# Patient Record
Sex: MALE | Race: WHITE | HISPANIC OR LATINO | Employment: FULL TIME | URBAN - METROPOLITAN AREA
[De-identification: names, ages, dates, MRNs, and addresses within clinical notes are randomized per-mention and may not be internally consistent; named-entity substitution may affect disease eponyms.]

---

## 2020-02-22 ENCOUNTER — OCCUPATIONAL MEDICINE (OUTPATIENT)
Dept: URGENT CARE | Facility: PHYSICIAN GROUP | Age: 55
End: 2020-02-22
Payer: COMMERCIAL

## 2020-02-22 VITALS
HEART RATE: 87 BPM | DIASTOLIC BLOOD PRESSURE: 90 MMHG | WEIGHT: 240 LBS | RESPIRATION RATE: 14 BRPM | OXYGEN SATURATION: 94 % | BODY MASS INDEX: 31.81 KG/M2 | TEMPERATURE: 98.7 F | SYSTOLIC BLOOD PRESSURE: 136 MMHG | HEIGHT: 73 IN

## 2020-02-22 DIAGNOSIS — S69.92XA INJURY OF LEFT HAND, INITIAL ENCOUNTER: ICD-10-CM

## 2020-02-22 DIAGNOSIS — Z02.1 PRE-EMPLOYMENT DRUG SCREENING: Primary | ICD-10-CM

## 2020-02-22 DIAGNOSIS — S61.217A LACERATION OF LEFT LITTLE FINGER WITHOUT FOREIGN BODY WITHOUT DAMAGE TO NAIL, INITIAL ENCOUNTER: ICD-10-CM

## 2020-02-22 DIAGNOSIS — S61.215A LACERATION OF LEFT RING FINGER WITHOUT FOREIGN BODY WITHOUT DAMAGE TO NAIL, INITIAL ENCOUNTER: ICD-10-CM

## 2020-02-22 LAB
AMP AMPHETAMINE: NORMAL
BAR BARBITURATES: NORMAL
BREATH ALCOHOL COMMENT: NORMAL
BZO BENZODIAZEPINES: NORMAL
COC COCAINE: NORMAL
INT CON NEG: NEGATIVE
INT CON POS: POSITIVE
MDMA ECSTASY: NORMAL
MET METHAMPHETAMINES: NORMAL
MTD METHADONE: NORMAL
OPI OPIATES: NORMAL
OXY OXYCODONE: NORMAL
PCP PHENCYCLIDINE: NORMAL
POC BREATHALIZER: 0 PERCENT (ref 0–0.01)
POC URINE DRUG SCREEN OCDRS: NORMAL
THC: NORMAL

## 2020-02-22 PROCEDURE — 90471 IMMUNIZATION ADMIN: CPT | Performed by: INTERNAL MEDICINE

## 2020-02-22 PROCEDURE — 99204 OFFICE O/P NEW MOD 45 MIN: CPT | Mod: 25 | Performed by: INTERNAL MEDICINE

## 2020-02-22 PROCEDURE — 90715 TDAP VACCINE 7 YRS/> IM: CPT | Performed by: INTERNAL MEDICINE

## 2020-02-22 PROCEDURE — 80305 DRUG TEST PRSMV DIR OPT OBS: CPT | Performed by: INTERNAL MEDICINE

## 2020-02-22 PROCEDURE — 82075 ASSAY OF BREATH ETHANOL: CPT | Performed by: INTERNAL MEDICINE

## 2020-02-22 PROCEDURE — 12041 INTMD RPR N-HF/GENIT 2.5CM/<: CPT | Performed by: INTERNAL MEDICINE

## 2020-02-22 ASSESSMENT — PAIN SCALES - GENERAL: PAINLEVEL: 5=MODERATE PAIN

## 2020-02-22 NOTE — LETTER
[facility] [place]  [address] [place] [address]  Phone:  [phone] - Fax:  [phone]   [facility] Progress Report and Disability Certification  Date of Service: 2/22/2020   No Show:  No  Date / Time of Next Visit: 2/25/2020   Claim Information   Patient Name: Hector Hernandez  Claim Number:     Employer: [employer]  Date of Injury: 2/22/2020     Insurer / TPA: [employer]  ID / SSN:     Occupation:  Diagnosis: Diagnoses of Injury of left hand, initial encounter, Laceration of left little finger without foreign body without damage to nail, initial encounter, and Laceration of left ring finger without foreign body without damage to nail, initial encounter were pertinent to this visit.    Medical Information   Related to Industrial Injury? Yes    Subjective Complaints:  Hector Hernandez is a 54 y.o. male who presents for Laceration (Left pink Lac)     Patient was removing a jelly roll from the machine and his 2 fingers on his left hand caught the blade and and he has 1 very small laceration on the ring finger and a 2 cm laceration on the left fifth finger  Laceration    The incident occurred 3 to 6 hours ago. The laceration is located on the left hand. The laceration is 2 cm in size. The laceration mechanism was a metal edge. The pain is mild. He reports no foreign bodies present. His tetanus status is out of date.    Objective Findings: Physical Exam  Musculoskeletal:         General: Signs of injury present.      Comments: Left hand-fifth finger 2 cm laceration nongas gaping and not bleeding and range of motion is within normal limits and no tendon injury and no distal neurovascular abnormality    Ring finger 3 mm laceration non-gaping and nonbleeding and range of motion is within normal limits        Pre-Existing Condition(s):     Assessment:   Initial Visit    Status: Additional Care Required  Permanent  Disability:No    Plan:      Diagnostics:      Comments:       Disability Information   Status: Released to Restricted Duty    From:  2/22/2020  Through: 2/25/2020 Restrictions are: Temporary  Comments:No use of left hand for 3 days and will reevaluate   Physical Restrictions   Sitting:    Standing:    Stooping:    Bending:      Squatting:    Walking:    Climbing:    Pushing:      Pulling:    Other:    Reaching Above Shoulder (L):   Reaching Above Shoulder (R):       Reaching Below Shoulder (L):    Reaching Below Shoulder (R):      Not to exceed Weight Limits   Carrying(hrs):   Weight Limit(lb):   Lifting(hrs):   Weight  Limit(lb):     Comments:      Repetitive Actions   Hands: i.e. Fine Manipulations from Grasping:     Feet: i.e. Operating Foot Controls:     Driving / Operate Machinery:     Physician Name: Helder Ren M.D. Physician Signature: HELDER Ferraro M.D. e-Signature: Dr. Erick Sandhu, Medical Director   Clinic Name / Location: [address] [address] Clinic Phone Number: Dept: [phone]   Appointment Time: 3:30 Pm Visit Start Time: 4:46 PM   Check-In Time:  3:48 Pm Visit Discharge Time: 5:25 PM   Original-Treating Physician or Chiropractor    Page 2-Insurer/TPA    Page 3-Employer    Page 4-Employee

## 2020-02-23 NOTE — PROGRESS NOTES
Subjective:     Hector Hernandez is a 54 y.o. male who presents for Laceration (Left pink Lac)    Hector Hernandez is a 54 y.o. male who presents for Laceration (Left pink Lac)     Patient was removing a jelly roll from the machine and his 2 fingers on his left hand caught the blade and and he has 1 very small laceration on the ring finger and a 2 cm laceration on the left fifth finger  Laceration    The incident occurred 3 to 6 hours ago. The laceration is located on the left hand. The laceration is 2 cm in size. The laceration mechanism was a metal edge. The pain is mild. He reports no foreign bodies present. His tetanus status is out of date. Patient was removing a jelly roll from the machine and his 2 fingers on his left hand caught the blade and and he has 1 very small laceration on the ring finger and a 2 cm laceration on the left fifth finger  Laceration    The incident occurred 3 to 6 hours ago. The laceration is located on the left hand. The laceration is 2 cm in size. The laceration mechanism was a metal edge. The pain is mild. He reports no foreign bodies present. His tetanus status is out of date.   History reviewed. No pertinent past medical history.History reviewed. No pertinent surgical history.  Social History     Socioeconomic History   • Marital status:      Spouse name: Not on file   • Number of children: Not on file   • Years of education: Not on file   • Highest education level: Not on file   Occupational History   • Not on file   Social Needs   • Financial resource strain: Not on file   • Food insecurity     Worry: Not on file     Inability: Not on file   • Transportation needs     Medical: Not on file     Non-medical: Not on file   Tobacco Use   • Smoking status: Never Smoker   • Smokeless tobacco: Never Used   Substance and Sexual Activity   • Alcohol use: Not on file   • Drug use: Not on file   • Sexual activity: Not on file   Lifestyle   • Physical activity     Days per week: Not on file  "    Minutes per session: Not on file   • Stress: Not on file   Relationships   • Social connections     Talks on phone: Not on file     Gets together: Not on file     Attends Rastafarian service: Not on file     Active member of club or organization: Not on file     Attends meetings of clubs or organizations: Not on file     Relationship status: Not on file   • Intimate partner violence     Fear of current or ex partner: Not on file     Emotionally abused: Not on file     Physically abused: Not on file     Forced sexual activity: Not on file   Other Topics Concern   • Not on file   Social History Narrative   • Not on file    History reviewed. No pertinent family history. Review of Systems   All other systems reviewed and are negative.  No Known Allergies   Objective:   /90   Pulse 87   Temp 37.1 °C (98.7 °F)   Resp 14   Ht 1.854 m (6' 1\")   Wt 108.9 kg (240 lb)   SpO2 94%   BMI 31.66 kg/m²   Physical Exam  Constitutional:       General: He is not in acute distress.     Appearance: He is well-developed.   HENT:      Head: Normocephalic and atraumatic.      Mouth/Throat:      Mouth: Mucous membranes are moist.      Pharynx: Oropharynx is clear.   Eyes:      Conjunctiva/sclera: Conjunctivae normal.   Neck:      Musculoskeletal: No neck rigidity.   Cardiovascular:      Rate and Rhythm: Normal rate and regular rhythm.   Pulmonary:      Effort: Pulmonary effort is normal. No respiratory distress.      Breath sounds: Normal breath sounds.   Musculoskeletal:         General: Signs of injury present.      Comments: Left hand-fifth finger 2 cm laceration nongas gaping and not bleeding and range of motion is within normal limits and no tendon injury and no distal neurovascular abnormality    Ring finger 3 mm laceration non-gaping and nonbleeding and range of motion is within normal limits   Lymphadenopathy:      Cervical: No cervical adenopathy.   Skin:     General: Skin is warm and dry.      Capillary Refill: " Capillary refill takes less than 2 seconds.   Neurological:      Mental Status: He is alert and oriented to person, place, and time.      Sensory: No sensory deficit.      Deep Tendon Reflexes: Reflexes are normal and symmetric.   Psychiatric:         Mood and Affect: Mood normal.         Behavior: Behavior normal.       Physical Exam  Musculoskeletal:         General: Signs of injury present.      Comments: Left hand-fifth finger 2 cm laceration nongas gaping and not bleeding and range of motion is within normal limits and no tendon injury and no distal neurovascular abnormality    Ring finger 3 mm laceration non-gaping and nonbleeding and range of motion is within normal limits        Assessment/Plan:   Assessment    1. Injury of left hand, initial encounter  - Tdap =>6yo IM    2. Laceration of left little finger without foreign body without damage to nail, initial encounter  - Tdap =>6yo IM    3. Laceration of left ring finger without foreign body without damage to nail, initial encounter  - Tdap =>6yo IM    steristrips applied      Differential diagnosis, natural history, supportive care, and indications for immediate follow-up discussed.

## 2020-02-25 ENCOUNTER — OCCUPATIONAL MEDICINE (OUTPATIENT)
Dept: URGENT CARE | Facility: PHYSICIAN GROUP | Age: 55
End: 2020-02-25
Payer: COMMERCIAL

## 2020-02-25 VITALS
TEMPERATURE: 99.1 F | SYSTOLIC BLOOD PRESSURE: 146 MMHG | HEART RATE: 89 BPM | HEIGHT: 73 IN | RESPIRATION RATE: 13 BRPM | OXYGEN SATURATION: 97 % | DIASTOLIC BLOOD PRESSURE: 92 MMHG | BODY MASS INDEX: 31.81 KG/M2 | WEIGHT: 240 LBS

## 2020-02-25 DIAGNOSIS — S61.217D LACERATION OF LEFT LITTLE FINGER WITHOUT FOREIGN BODY WITHOUT DAMAGE TO NAIL, SUBSEQUENT ENCOUNTER: Primary | ICD-10-CM

## 2020-02-25 PROCEDURE — 99214 OFFICE O/P EST MOD 30 MIN: CPT | Performed by: PHYSICIAN ASSISTANT

## 2020-02-25 ASSESSMENT — PAIN SCALES - GENERAL: PAINLEVEL: 1=MINIMAL PAIN

## 2020-02-25 NOTE — LETTER
[facility] [place]  [address] [place] [address]  Phone:  [phone] - Fax:  [phone]   [facility] Progress Report and Disability Certification  Date of Service: 2/25/2020   No Show:  No  Date / Time of Next Visit:   D/C MMI   Claim Information   Patient Name: Hector Hernandez  Claim Number:     Employer: [employer] [facility] [employer] Date of Injury: 2/22/2020     Insurer / TPA: [employer]  ID / SSN:     Occupation:   Diagnosis: The encounter diagnosis was Laceration of left little finger without foreign body without damage to nail, subsequent encounter.    Medical Information   Related to Industrial Injury? Yes    Subjective Complaints:  DOI: 2/22/20    Pt returns for follow up with well healed laceration. Patient restates he was removing a jelly roll from the machine and his 2 fingers on his left hand caught the blade and and he has 1 very small laceration on the ring finger and a 2 cm laceration on the left fifth finger  Laceration    The incident occurred 3 days ago. The laceration is located on the left hand. The laceration is 2 cm in size. The laceration mechanism was a metal edge. The pain is mild. He reports no foreign bodies present. His tetanus status is now UTD from last visit. Pt has not taken any Rx medications for this condition. Pt states the pain is a 0-1/10, aching in nature and worse at night. Pt denies CP, SOB, NVD, paresthesias, headaches, dizziness, change in vision, hives, or other joint pain. The pt's medication list, problem list, and allergies have been evaluated and reviewed during today's visit.     Objective Findings: Left little finger: No ecchymoses, no edema, no erythema, no signs of infection present, No TTP. +AROM, +SILT, STR 5/5.     Pre-Existing Condition(s):     Assessment:   Condition Improved    Status: Discharged /  MMI  Permanent Disability:No    Plan:      Diagnostics:         Comments:       Disability Information   Status: Released to Full Duty    From:     Through:   Restrictions are:     Physical Restrictions   Sitting:    Standing:    Stooping:    Bending:      Squatting:    Walking:    Climbing:    Pushing:      Pulling:    Other:    Reaching Above Shoulder (L):   Reaching Above Shoulder (R):       Reaching Below Shoulder (L):    Reaching Below Shoulder (R):      Not to exceed Weight Limits   Carrying(hrs):   Weight Limit(lb):   Lifting(hrs):   Weight  Limit(lb):     Comments: D/C MMI    Repetitive Actions   Hands: i.e. Fine Manipulations from Grasping:     Feet: i.e. Operating Foot Controls:     Driving / Operate Machinery:     Physician Name: Gavin Silva P.A.-C. Physician Signature: GAVIN Benitez P.A.-C. e-Signature: Dr. Erick Sandhu, Medical Director   Clinic Name / Location: [address] [address] Clinic Phone Number: Dept: [phone]   Appointment Time: 10:00 Am Visit Start Time: 10:41 AM   Check-In Time:  10:25 Am Visit Discharge Time: 10:50 AM   Original-Treating Physician or Chiropractor    Page 2-Insurer/TPA    Page 3-Employer    Page 4-Employee

## 2020-02-25 NOTE — PATIENT INSTRUCTIONS
Laceration Care, Adult  A laceration is a cut that goes through all layers of the skin. The cut also goes into the tissue that is right under the skin. Some cuts heal on their own. Others need to be closed with stitches (sutures), staples, skin adhesive strips, or wound glue. Taking care of your cut lowers your risk of infection and helps your cut to heal better.  HOW TO TAKE CARE OF YOUR CUT  For stitches or staples:  · Keep the wound clean and dry.  · If you were given a bandage (dressing), you should change it at least one time per day or as told by your doctor. You should also change it if it gets wet or dirty.  · Keep the wound completely dry for the first 24 hours or as told by your doctor. After that time, you may take a shower or a bath. However, make sure that the wound is not soaked in water until after the stitches or staples have been removed.  · Clean the wound one time each day or as told by your doctor:  ¨ Wash the wound with soap and water.  ¨ Rinse the wound with water until all of the soap comes off.  ¨ Pat the wound dry with a clean towel. Do not rub the wound.  · After you clean the wound, put a thin layer of antibiotic ointment on it as told by your doctor. This ointment:  ¨ Helps to prevent infection.  ¨ Keeps the bandage from sticking to the wound.  · Have your stitches or staples removed as told by your doctor.  If your doctor used skin adhesive strips:   · Keep the wound clean and dry.  · If you were given a bandage, you should change it at least one time per day or as told by your doctor. You should also change it if it gets dirty or wet.  · Do not get the skin adhesive strips wet. You can take a shower or a bath, but be careful to keep the wound dry.  · If the wound gets wet, pat it dry with a clean towel. Do not rub the wound.  · Skin adhesive strips fall off on their own. You can trim the strips as the wound heals. Do not remove any strips that are still stuck to the wound. They will  fall off after a while.  If your doctor used wound glue:  · Try to keep your wound dry, but you may briefly wet it in the shower or bath. Do not soak the wound in water, such as by swimming.  · After you take a shower or a bath, gently pat the wound dry with a clean towel. Do not rub the wound.  · Do not do any activities that will make you really sweaty until the skin glue has fallen off on its own.  · Do not apply liquid, cream, or ointment medicine to your wound while the skin glue is still on.  · If you were given a bandage, you should change it at least one time per day or as told by your doctor. You should also change it if it gets dirty or wet.  · If a bandage is placed over the wound, do not let the tape for the bandage touch the skin glue.  · Do not pick at the glue. The skin glue usually stays on for 5-10 days. Then, it falls off of the skin.  General Instructions   · To help prevent scarring, make sure to cover your wound with sunscreen whenever you are outside after stitches are removed, after adhesive strips are removed, or when wound glue stays in place and the wound is healed. Make sure to wear a sunscreen of at least 30 SPF.  · Take over-the-counter and prescription medicines only as told by your doctor.  · If you were given antibiotic medicine or ointment, take or apply it as told by your doctor. Do not stop using the antibiotic even if your wound is getting better.  · Do not scratch or pick at the wound.  · Keep all follow-up visits as told by your doctor. This is important.  · Check your wound every day for signs of infection. Watch for:  ¨ Redness, swelling, or pain.  ¨ Fluid, blood, or pus.  · Raise (elevate) the injured area above the level of your heart while you are sitting or lying down, if possible.  GET HELP IF:  · You got a tetanus shot and you have any of these problems at the injection site:  ¨ Swelling.  ¨ Very bad pain.  ¨ Redness.  ¨ Bleeding.  · You have a fever.  · A wound that was  closed breaks open.  · You notice a bad smell coming from your wound or your bandage.  · You notice something coming out of the wound, such as wood or glass.  · Medicine does not help your pain.  · You have more redness, swelling, or pain at the site of your wound.  · You have fluid, blood, or pus coming from your wound.  · You notice a change in the color of your skin near your wound.  · You need to change the bandage often because fluid, blood, or pus is coming from the wound.  · You start to have a new rash.  · You start to have numbness around the wound.  GET HELP RIGHT AWAY IF:  · You have very bad swelling around the wound.  · Your pain suddenly gets worse and is very bad.  · You notice painful lumps near the wound or on skin that is anywhere on your body.  · You have a red streak going away from your wound.  · The wound is on your hand or foot and you cannot move a finger or toe like you usually can.  · The wound is on your hand or foot and you notice that your fingers or toes look pale or bluish.     This information is not intended to replace advice given to you by your health care provider. Make sure you discuss any questions you have with your health care provider.     Document Released: 06/05/2009 Document Revised: 05/03/2016 Document Reviewed: 12/14/2015  [employer] Interactive Patient Education ©2016 [employer]

## 2020-02-25 NOTE — PROGRESS NOTES
Subjective:      Pt is a 54 y.o. male who presents with Follow-Up ()      DOI: 2/22/20    Pt returns for follow up with well healed laceration. Patient restates he was removing a jelly roll from the machine and his 2 fingers on his left hand caught the blade and and he has 1 very small laceration on the ring finger and a 2 cm laceration on the left fifth finger  Laceration    The incident occurred 3 days ago. The laceration is located on the left hand. The laceration is 2 cm in size. The laceration mechanism was a metal edge. The pain is mild. He reports no foreign bodies present. His tetanus status is now UTD from last visit. Pt has not taken any Rx medications for this condition. Pt states the pain is a 0-1/10, aching in nature and worse at night. Pt denies CP, SOB, NVD, paresthesias, headaches, dizziness, change in vision, hives, or other joint pain. The pt's medication list, problem list, and allergies have been evaluated and reviewed during today's visit.       HPI  PMH:  Negative per pt.      PSH:  Negative per pt.      Fam Hx:  the patient's family history is not pertinent to their current complaint      Soc HX:  Social History     Socioeconomic History   • Marital status:      Spouse name: Not on file   • Number of children: Not on file   • Years of education: Not on file   • Highest education level: Not on file   Occupational History   • Not on file   Social Needs   • Financial resource strain: Not on file   • Food insecurity     Worry: Not on file     Inability: Not on file   • Transportation needs     Medical: Not on file     Non-medical: Not on file   Tobacco Use   • Smoking status: Never Smoker   • Smokeless tobacco: Never Used   Substance and Sexual Activity   • Alcohol use: Not on file   • Drug use: Not on file   • Sexual activity: Not on file   Lifestyle   • Physical activity     Days per week: Not on file     Minutes per session: Not on file   • Stress: Not on file   Relationships   •  "Social connections     Talks on phone: Not on file     Gets together: Not on file     Attends Mosque service: Not on file     Active member of club or organization: Not on file     Attends meetings of clubs or organizations: Not on file     Relationship status: Not on file   • Intimate partner violence     Fear of current or ex partner: Not on file     Emotionally abused: Not on file     Physically abused: Not on file     Forced sexual activity: Not on file   Other Topics Concern   • Not on file   Social History Narrative   • Not on file         Medications:  No current outpatient medications on file.      Allergies:  Patient has no known allergies.    ROS  Constitutional: Negative for fever, chills and malaise/fatigue.   HENT: Negative for congestion and sore throat.    Eyes: Negative for blurred vision, double vision and photophobia.   Respiratory: Negative for cough and shortness of breath.  Cardiovascular: Negative for chest pain and palpitations.   Gastrointestinal: Negative for heartburn, nausea, vomiting, abdominal pain, diarrhea and constipation.   Genitourinary: Negative for dysuria and flank pain.   Musculoskeletal: POS for left little finger lac and myalgias.   Skin: Negative for itching and rash.   Neurological: Negative for dizziness, tingling and headaches.   Endo/Heme/Allergies: Does not bruise/bleed easily.   Psychiatric/Behavioral: Negative for depression. The patient is not nervous/anxious.           Objective:     /92   Pulse 89   Temp 37.3 °C (99.1 °F)   Resp 13   Ht 1.854 m (6' 1\")   Wt 108.9 kg (240 lb)   SpO2 97%   BMI 31.66 kg/m²      Physical Exam    Left little finger: No ecchymoses, no edema, no erythema, no signs of infection present, No TTP. +AROM, +SILT, STR 5/5.    Constitutional: PT is oriented to person, place, and time. PT appears well-developed and well-nourished. No distress.   HENT:   Head: Normocephalic and atraumatic.   Mouth/Throat: Oropharynx is clear and " moist. No oropharyngeal exudate.   Eyes: Conjunctivae normal and EOM are normal. Pupils are equal, round, and reactive to light.   Neck: Normal range of motion. Neck supple. No thyromegaly present.   Cardiovascular: Normal rate, regular rhythm, normal heart sounds and intact distal pulses.  Exam reveals no gallop and no friction rub.    No murmur heard.  Pulmonary/Chest: Effort normal and breath sounds normal. No respiratory distress. PT has no wheezes. PT has no rales. Pt exhibits no tenderness.   Abdominal: Soft. Bowel sounds are normal. PT exhibits no distension and no mass. There is no tenderness. There is no rebound and no guarding.   Neurological: PT is alert and oriented to person, place, and time. PT has normal reflexes. No cranial nerve deficit.   Skin: Skin is warm and dry. No rash noted. PT is not diaphoretic. No erythema.       Psychiatric: PT has a normal mood and affect. PT behavior is normal. Judgment and thought content normal.        Assessment/Plan:       1. Laceration of left little finger without foreign body without damage to nail, subsequent encounter      RICE therapy discussed  Gentle ROM exercises discussed  WBAT left hand  Ice/heat therapy discussed  OTC ibuprofen for pain control  Rest, fluids encouraged.  AVS with medical info given.  Pt was in full understanding and agreement with the plan.  Follow-up as needed if symptoms worsen or fail to improve.  D/C MMI

## 2020-06-12 ENCOUNTER — OFFICE VISIT (OUTPATIENT)
Dept: URGENT CARE | Facility: CLINIC | Age: 55
End: 2020-06-12
Payer: COMMERCIAL

## 2020-06-12 VITALS
BODY MASS INDEX: 34.46 KG/M2 | DIASTOLIC BLOOD PRESSURE: 94 MMHG | WEIGHT: 260 LBS | RESPIRATION RATE: 16 BRPM | TEMPERATURE: 98.1 F | HEIGHT: 73 IN | OXYGEN SATURATION: 94 % | SYSTOLIC BLOOD PRESSURE: 142 MMHG | HEART RATE: 81 BPM

## 2020-06-12 DIAGNOSIS — R21 SKIN RASH: ICD-10-CM

## 2020-06-12 PROCEDURE — 99214 OFFICE O/P EST MOD 30 MIN: CPT | Performed by: PHYSICIAN ASSISTANT

## 2020-06-12 RX ORDER — METHYLPREDNISOLONE 4 MG/1
TABLET ORAL
Qty: 21 TAB | Refills: 0 | Status: SHIPPED | OUTPATIENT
Start: 2020-06-12 | End: 2022-07-09

## 2020-06-12 RX ORDER — AMLODIPINE BESYLATE AND BENAZEPRIL HYDROCHLORIDE 10; 20 MG/1; MG/1
1 CAPSULE ORAL
COMMUNITY
Start: 2020-05-18 | End: 2022-07-09

## 2020-06-12 RX ORDER — GEMFIBROZIL 600 MG/1
600 TABLET, FILM COATED ORAL
COMMUNITY
Start: 2020-05-18 | End: 2022-07-09

## 2020-06-12 ASSESSMENT — ENCOUNTER SYMPTOMS
VOMITING: 0
FEVER: 0
SORE THROAT: 0
COUGH: 0
DIARRHEA: 0
NAUSEA: 0
SHORTNESS OF BREATH: 0
EYE DISCHARGE: 0
HEADACHES: 0
EYE REDNESS: 0

## 2020-06-12 NOTE — PROGRESS NOTES
"Subjective:      Hector Hernandez is a 54 y.o. male who presents with Rash        Rash   This is a new problem. Episode onset: x 3 days ago. The problem is unchanged. The rash is diffuse. The rash is characterized by itchiness. He was exposed to nothing. Pertinent negatives include no congestion, cough, diarrhea, facial edema, fever, joint pain, shortness of breath, sore throat or vomiting. Treatments tried: The patient states he has been using Safe Guard Soap and Aveno Body Wash for his current symptoms. The treatment provided mild relief.     PMH:  has no past medical history on file.  MEDS:   Current Outpatient Medications:   •  amlodipine-benazepril (LOTREL) 10-20 MG per capsule, Take 1 Cap by mouth., Disp: , Rfl:   •  gemfibrozil (LOPID) 600 MG Tab, TAKE 1 TABLET BY MOUTH TWICE DAILY FOR 90 DAYS, Disp: , Rfl:   ALLERGIES: No Known Allergies  SURGHX: History reviewed. No pertinent surgical history.  SOCHX:  reports that he has never smoked. He has never used smokeless tobacco.  FH: Family history was reviewed, no pertinent findings to report    Review of Systems   Constitutional: Negative for fever.   HENT: Negative for congestion, ear pain and sore throat.    Eyes: Negative for discharge and redness.   Respiratory: Negative for cough and shortness of breath.    Cardiovascular: Negative for chest pain and leg swelling.   Gastrointestinal: Negative for diarrhea, nausea and vomiting.   Musculoskeletal: Negative for joint pain.   Skin: Positive for rash.   Neurological: Negative for headaches.          Objective:     /94 (BP Location: Right arm, Patient Position: Sitting)   Pulse 81   Temp 36.7 °C (98.1 °F)   Resp 16   Ht 1.854 m (6' 1\")   Wt 117.9 kg (260 lb)   SpO2 94%   BMI 34.30 kg/m²      Physical Exam  Constitutional:       General: He is not in acute distress.     Appearance: Normal appearance. He is not ill-appearing.   HENT:      Head: Normocephalic and atraumatic.      Comments: No signs of " angioedema.     Right Ear: External ear normal.      Left Ear: External ear normal.      Nose: Nose normal.      Mouth/Throat:      Mouth: Mucous membranes are moist. No angioedema.      Pharynx: Oropharynx is clear. Uvula midline. No posterior oropharyngeal erythema or uvula swelling.      Tonsils: No tonsillar exudate.      Comments: No swelling of the tongue or throat.  Eyes:      Extraocular Movements: Extraocular movements intact.      Conjunctiva/sclera: Conjunctivae normal.   Neck:      Musculoskeletal: Normal range of motion and neck supple.   Cardiovascular:      Rate and Rhythm: Normal rate and regular rhythm.      Heart sounds: Normal heart sounds.   Pulmonary:      Effort: Pulmonary effort is normal. No respiratory distress.      Breath sounds: Normal breath sounds. No wheezing.   Musculoskeletal: Normal range of motion.   Skin:     General: Skin is warm and dry.      Findings: Rash present. Rash is papular. Rash is not urticarial or vesicular.             Comments:   Diffuse erythematous rough papules to the patient's chest, abdomen, back, and bilateral upper and lower extremities.  No tenderness to palpation.  No swelling.  No surrounding erythema.  No increased warmth.  No discharge/weeping.  No vesicles.  No pustules.   Neurological:      Mental Status: He is alert and oriented to person, place, and time.                 Assessment/Plan:     1. Skin rash  - methylPREDNISolone (MEDROL DOSEPAK) 4 MG Tablet Therapy Pack; Follow schedule on package instructions.  Dispense: 21 Tab; Refill: 0    The patient's presenting symptoms and physical exam findings are consistent with a diffuse skin rash.  On physical exam, the patient had diffuse erythematous rough papules to the chest, abdomen, back, and bilateral upper and lower extremities without tenderness to palpation, swelling, surrounding erythema, increased warmth, discharge/weeping, vesicles, or pustules.  The patient reports no new exposures to soaps,  lotions, detergents, medications, or foods.  The cause of the patient's skin rash is unknown at this time.  Will prescribe the patient a Medrol Dosepak for his current symptoms.  Advised the patient of the associated side effects of oral steroids, including immunosuppression.  The patient verbalized understanding.  Based on the patient's presenting symptoms and physical exam findings, it is unlikely the patient skin rash is secondarily infected at this time.  Recommend OTC medications and supportive care for symptomatic management.  Recommend patient follow-up with his PCP.  Discussed return precautions with the patient, and he verbalized understanding.    Differential diagnoses, supportive care, and indications for immediate follow-up discussed with patient.   Instructed to return to clinic or nearest emergency department for any change in condition, further concerns, or worsening of symptoms.    OTC antihistamines for symptomatic relief  Apply moisturizer to the affected areas  Monitor for signs of infection  Follow-up with PCP  Return to clinic or go to the ED if symptoms worsen or fail to improve, or if the patient should develop worsening/increasing/persistent skin rash, pain/tenderness to the affected area, swelling, increased redness or warmth, discharge/weeping, nausea/vomiting, fever/chills, secondary signs of infection, and/or any concerning symptoms.    Discussed plan with the patient, and he agrees to the above.    Please note that this dictation was created using voice recognition software. I have made every reasonable attempt to correct obvious errors, but I expect that there may be errors of grammar and possibly content that I did not discover before finalizing the note.

## 2021-01-15 ENCOUNTER — OCCUPATIONAL MEDICINE (OUTPATIENT)
Dept: URGENT CARE | Facility: CLINIC | Age: 56
End: 2021-01-15
Payer: COMMERCIAL

## 2021-01-15 VITALS
RESPIRATION RATE: 18 BRPM | SYSTOLIC BLOOD PRESSURE: 120 MMHG | DIASTOLIC BLOOD PRESSURE: 82 MMHG | OXYGEN SATURATION: 95 % | HEIGHT: 73 IN | TEMPERATURE: 97.9 F | BODY MASS INDEX: 34.46 KG/M2 | HEART RATE: 84 BPM | WEIGHT: 260 LBS

## 2021-01-15 DIAGNOSIS — L24.89 IRRITANT CONTACT DERMATITIS DUE TO OTHER AGENTS: ICD-10-CM

## 2021-01-15 PROCEDURE — 99214 OFFICE O/P EST MOD 30 MIN: CPT | Performed by: PHYSICIAN ASSISTANT

## 2021-01-15 RX ORDER — TRIAMCINOLONE ACETONIDE 1 MG/G
1 OINTMENT TOPICAL 2 TIMES DAILY
Qty: 30 G | Refills: 1 | Status: SHIPPED | OUTPATIENT
Start: 2021-01-15 | End: 2022-07-09

## 2021-01-15 ASSESSMENT — ENCOUNTER SYMPTOMS
DIARRHEA: 0
MUSCULOSKELETAL NEGATIVE: 1
DIZZINESS: 0
ABDOMINAL PAIN: 0
CHILLS: 0
VOMITING: 0
NAUSEA: 0
SHORTNESS OF BREATH: 0
FEVER: 0

## 2021-01-15 NOTE — LETTER
"EMPLOYEE’S CLAIM FOR COMPENSATION/ REPORT OF INITIAL TREATMENT  FORM C-4    EMPLOYEE’S CLAIM - PROVIDE ALL INFORMATION REQUESTED   First Name  Hector Last Name  Mary Birthdate                    1965                Sex  male Claim Number   Home Address  [address] Age  55 y.o. Height  1.854 m (6' 1\") Weight  117.9 kg (260 lb) [facility]     [place] Zip  [address] Telephone  [phone] (home)    Mailing Address  [address] [place] [facility] Zip  [address] Primary Language Spoken  English    Insurer   Third Party   [employer]   Employee's Occupation (Job Title) When Injury or Occupational Disease Occurred      Employer's Name  [employer]  Telephone  [phone]    Employer Address  [address]  [facility] [facility]  Zip  [address]    Date of Injury  12/15/2020               Hour of Injury  7:00 AM Date Employer Notified  1/13/2021 Last Day of Work after Injury     or Occupational Disease  1/15/2021 Supervisor to Whom Injury     Reported  Enrique   Address or Location of Accident (if applicable)  [[employer]]   What were you doing at the time of accident? (if applicable)  Have skin irritation with gloves    How did this injury or occupational disease occur? (Be specific an answer in detail. Use additional sheet if necessary)  Since December I had skin irritation on my hands by wearing gloves.   If you believe that you have an occupational disease, when did you first have knowledge of the disability and it relationship to your employment?  n/a Witnesses to the Accident  no      Nature of Injury or Occupational Disease  Workers' Compensation  Part(s) of Body Injured or Affected  Hand (R), Hand (L),     I certify that the above is true and correct to the best of my knowledge and that I have provided this information in order to obtain the " benefits of [place]  I hereby authorize any physician, chiropractor, surgeon, practitioner, or other person, any hospital, including [facility] or [facility], any medical service organization, any insurance company, or other institution or organization to release to each other, any medical or other information, including benefits paid or payable, pertinent to this injury or disease, except information relative to diagnosis, treatment and/or counseling for AIDS, psychological conditions, alcohol or controlled substances, for which I must give specific authorization.  A Photostat of this authorization shall be as valid as the original.     Date 1/15/21   Place [facility]   Employee’s Signature   THIS REPORT MUST BE COMPLETED AND MAILED WITHIN 3 WORKING DAYS OF TREATMENT   Place  [facility]  Name of Facility  [facility]   Date  1/15/2021 Diagnosis  (L24.89) Irritant contact dermatitis due to other agents Is there evidence the injured employee was under the              influence of alcohol and/or another controlled substance at the time of accident?   Hour  10:21 AM Description of Injury or Disease  The encounter diagnosis was Irritant contact dermatitis due to other agents. No   Treatment  Contact dermatitis likely secondary to glove use, no other identifiable causes for rash   Patient denies history of latex allergy - encouraged to stop using nitrile gloves and try wearing latex gloves at work   Keep all areas clean and dry - moisturize daily with unscented lotion   Avoid scented soaps and overexposure to UV light   Triamcinolone cream 0.1 % cream twice daily until resolved   Return in 1 week for follow up  Have you advised the patient to remain off work five days or     more? No   X-Ray Findings      If Yes   From Date  To Date      From information given by the  "employee, together with medical evidence, can you directly connect this injury or occupational disease as job incurred?  Yes If No Full Duty    Yes Modified Duty      Is additional medical care by a physician indicated?  Yes If Modified Duty, Specify any Limitations / Restrictions  No use of nitrile gloves, trial of latex glove instead    Do you know of any previous injury or disease contributing to this condition or occupational disease?                            No   Date  1/15/2021 Print Doctor’s Name   Dot Amanda P.A.-C. I certify the employer’s copy of  this form was mailed on:   Address  [address], [address] Insurer’s Use Only     [place] Zip  [address]    Provider’s Tax ID Number  [id number] Telephone  Dept: [phone]      e-DOT Estevez P.A.-C.  Signature:     Degree          ORIGINAL-TREATING PHYSICIAN OR CHIROPRACTOR    PAGE 2-INSURER/TPA    PAGE 3-EMPLOYER    PAGE 4-EMPLOYEE        Form C-4 (rev.10/07)           BRIEF DESCRIPTION OF RIGHTS AND BENEFITS  (Pursuant to [place] 616C.050)    Notice of Injury or Occupational Disease (Incident Report Form C-1): If an injury or occupational disease (OD) arises out of and in the course of employment, you must provide written notice to your employer as soon as practicable, but no later than 7 days after the accident or OD. Your employer shall maintain a sufficient supply of the required forms.    Claim for Compensation (Form C-4): If medical treatment is sought, the form C-4 is available at the place of initial treatment. A completed \"Claim for Compensation\" (Form C-4) must be filed within 90 days after an accident or OD. The treating physician or chiropractor must, within 3 working days after treatment, complete and mail to the employer, the employer's insurer and third-party , the Claim for Compensation.    Medical Treatment: If you require medical treatment for your on-the-job injury or OD, you may be required to " select a physician or chiropractor from a list provided by your workers’ compensation insurer, if it has contracted with an Organization for Managed Care (MCO) or Preferred Provider Organization (PPO) or providers of health care. If your employer has not entered into a contract with an MCO or PPO, you may select a physician or chiropractor from the Panel of Physicians and Chiropractors. Any medical costs related to your industrial injury or OD will be paid by your insurer.    Temporary Total Disability (TTD): If your doctor has certified that you are unable to work for a period of at least 5 consecutive days, or 5 cumulative days in a 20-day period, or places restrictions on you that your employer does not accommodate, you may be entitled to TTD compensation.    Temporary Partial Disability (TPD): If the wage you receive upon reemployment is less than the compensation for TTD to which you are entitled, the insurer may be required to pay you TPD compensation to make up the difference. TPD can only be paid for a maximum of 24 months.    Permanent Partial Disability (PPD): When your medical condition is stable and there is an indication of a PPD as a result of your injury or OD, within 30 days, your insurer must arrange for an evaluation by a rating physician or chiropractor to determine the degree of your PPD. The amount of your PPD award depends on the date of injury, the results of the PPD evaluation, your age and wage.    Permanent Total Disability (PTD): If you are medically certified by a treating physician or chiropractor as permanently and totally disabled and have been granted a PTD status by your insurer, you are entitled to receive monthly benefits not to exceed 66 2/3% of your average monthly wage. The amount of your PTD payments is subject to reduction if you previously received a lump-sum PPD award.    Vocational Rehabilitation Services: You may be eligible for vocational rehabilitation services if you  are unable to return to the job due to a permanent physical impairment or permanent restrictions as a result of your injury or occupational disease.    Transportation and Per Rosenda Reimbursement: You may be eligible for travel expenses and per rosenda associated with medical treatment.    Reopening: You may be able to reopen your claim if your condition worsens after claim closure.     Appeal Process: If you disagree with a written determination issued by the insurer or the insurer does not respond to your request, you may appeal to the Department of Administration, , by following the instructions contained in your determination letter. You must appeal the determination within 70 days from the date of the determination letter at [address], [address], [place], [place] [address], or [address], [address], [place], [place] [address]. If you disagree with the  decision, you may appeal to the Department of Administration, . You must file your appeal within 30 days from the date of the  decision letter at [address], [address], [place], [place] [address], or [address] [facility] [address], [place] [address]. If you disagree with a decision of an , you may file a petition for judicial review with the District Court. You must do so within 30 days of the Appeal Officer’s decision. You may be represented by an  at your own expense or you may contact the [facility] for possible representation.    [place]  for Injured Workers (NAIW): If you disagree with a  decision, you may request that NAIW represent you without charge at an  Hearing. For information regarding denial of benefits, you may contact the [facility] at: [address], [address] [address], [phone], or [address], [address], [place] [address], [phone]    To File a Complaint with  the Division: If you wish to file a complaint with the  of the Division of Industrial Relations (DIR),  please contact the Workers’ Compensation Section, [address], [address], [place], [place] [address], telephone (580) [phone], or [address], [address], [place], [place] [address], telephone [phone].    For assistance with Workers’ Compensation Issues: You may contact the [facility] Office for Consumer Health Assistance, [address], [address], [place], [place] [address], Toll Free 1-878.697.3301, Web site: http://[facility].[place].gov/Programs/GLORIA E-mail: gloria@[facility].[place].gov              __________________________________________________________________                                    _____1/15/21____________            Employee Name / Signature                                                                                                                            Date                                                                                                                                                                                                                              D-2 (rev. 10/20)

## 2021-01-15 NOTE — LETTER
[facility]  [address], [address] [place] [address]  Phone:  [phone] - Fax:  [phone]   [facility] Progress Report and Disability Certification  Date of Service: 1/15/2021   No Show:  No  Date / Time of Next Visit: 1/22/2021   Claim Information   Patient Name: Hector Hernandez  Claim Number:     Employer: [employer] [facility]  Date of Injury: 12/15/2020     Insurer / TPA: [employer] [facility]  ID / SSN:     Occupation:   Diagnosis: The encounter diagnosis was Irritant contact dermatitis due to other agents.    Medical Information   Related to Industrial Injury? Yes    Subjective Complaints:  DOI: 12/15/20 (approximately). Patient presents to urgent care reporting a rash on his hands starting about 1 month ago. He believes it is caused by the nitrile gloves he wears at work. No rash anywhere else on the body. Rash is rough and slightly itchy. He has tried applying topical OTC hydrocortisone cream without significant relief. No history of skin problems. No new soaps, lotions, detergents, or medications.    Objective Findings: Skin: Raised, rough and erythematous excoriations noted on dorsal aspects of hands bilaterally. R>L. No overlying scaling or vesicular lesions.     Pre-Existing Condition(s): None    Assessment:   Initial Visit    Status: Additional Care Required  Permanent Disability:No    Plan: Medication    Diagnostics:      Comments:       Disability Information   Status: Released to Full Duty    From:  1/15/2021  Through: 1/22/2021 Restrictions are:     Physical Restrictions   Sitting:    Standing:    Stooping:    Bending:      Squatting:    Walking:    Climbing:    Pushing:      Pulling:    Other:    Reaching Above Shoulder (L):   Reaching Above Shoulder (R):       Reaching Below Shoulder (L):    Reaching Below Shoulder (R):      Not to exceed Weight Limits   Carrying(hrs):   Weight Limit(lb):   Lifting(hrs):    Weight  Limit(lb):     Comments: Contact dermatitis likely secondary to glove use, no other identifiable causes for rash  Patient denies history of latex allergy - encouraged to stop using nitrile gloves and try wearing latex gloves at work  Keep all areas clean and dry - moisturize daily with unscented lotion  Avoid scented soaps and overexposure to UV light  Triamcinolone cream 0.1 % cream twice daily until resolved  Return in 1 week for follow up    Repetitive Actions   Hands: i.e. Fine Manipulations from Grasping:     Feet: i.e. Operating Foot Controls:     Driving / Operate Machinery:     Provider Name:   Dot Amanda P.A.-C. Physician Signature:  Physician Name:     Clinic Name / Location: [address], [address] [place] [address] Clinic Phone Number: Dept: [phone]   Appointment Time: 10:00 Am Visit Start Time: 10:21 AM   Check-In Time:  10:04 Am Visit Discharge Time: 11:01 Am    Original-Treating Physician or Chiropractor    Page 2-Insurer/TPA    Page 3-Employer    Page 4-Employee

## 2021-01-15 NOTE — PROGRESS NOTES
"Subjective:      Hector Hernandez is a 55 y.o. male who presents with Rash (WC New, Possible allergic reaction)      DOI: 12/15/20 (approximately). Patient presents to urgent care reporting a rash on his hands starting about 1 month ago. He believes it is caused by the nitrile gloves he wears at work. No rash anywhere else on the body. Rash is rough and slightly itchy. He has tried applying topical OTC hydrocortisone cream without significant relief. No history of skin problems. No new soaps, lotions, detergents, or medications.      Rash  Pertinent negatives include no congestion, diarrhea, fever, shortness of breath or vomiting.       Review of Systems   Constitutional: Negative for chills and fever.   HENT: Negative for congestion.    Respiratory: Negative for shortness of breath.    Cardiovascular: Negative for chest pain.   Gastrointestinal: Negative for abdominal pain, diarrhea, nausea and vomiting.   Genitourinary: Negative.    Musculoskeletal: Negative.    Skin: Positive for itching and rash.   Neurological: Negative for dizziness.        Objective:     /82   Pulse 84   Temp 36.6 °C (97.9 °F) (Temporal)   Resp 18   Ht 1.854 m (6' 1\")   Wt 117.9 kg (260 lb)   SpO2 95%   BMI 34.30 kg/m²      Physical Exam  Vitals signs and nursing note reviewed.   Constitutional:       Appearance: Normal appearance. He is well-developed.   HENT:      Head: Normocephalic and atraumatic.   Eyes:      Conjunctiva/sclera: Conjunctivae normal.   Neck:      Musculoskeletal: Normal range of motion.   Cardiovascular:      Rate and Rhythm: Normal rate.   Pulmonary:      Effort: Pulmonary effort is normal.   Musculoskeletal: Normal range of motion.   Skin:     General: Skin is warm and dry.             Comments: Raised, rough and erythematous excoriations noted on dorsal aspects of hands bilaterally. R>L. No overlying scaling or vesicular lesions.    Neurological:      Mental Status: He is alert and oriented to person, place, " and time.   Psychiatric:         Behavior: Behavior normal.          PMH:  has no past medical history on file.  MEDS:   Current Outpatient Medications:   •  triamcinolone acetonide (KENALOG) 0.1 % Ointment, Apply 1 Application topically 2 times a day., Disp: 30 g, Rfl: 1  •  amlodipine-benazepril (LOTREL) 10-20 MG per capsule, Take 1 Cap by mouth., Disp: , Rfl:   •  gemfibrozil (LOPID) 600 MG Tab, TAKE 1 TABLET BY MOUTH TWICE DAILY FOR 90 DAYS, Disp: , Rfl:   •  methylPREDNISolone (MEDROL DOSEPAK) 4 MG Tablet Therapy Pack, Follow schedule on package instructions., Disp: 21 Tab, Rfl: 0  ALLERGIES: No Known Allergies  SURGHX: History reviewed. No pertinent surgical history.  SOCHX:  reports that he has never smoked. He has never used smokeless tobacco.  FH: family history is not on file.       Assessment/Plan:        1. Irritant contact dermatitis due to other agents    - triamcinolone acetonide (KENALOG) 0.1 % Ointment; Apply 1 Application topically 2 times a day.  Dispense: 30 g; Refill: 1    Contact dermatitis likely secondary to glove use, no other identifiable causes for rash   Patient denies history of latex allergy - encouraged to stop using nitrile gloves and try wearing latex gloves at work   Keep all areas clean and dry - moisturize daily with unscented lotion   Avoid scented soaps and overexposure to UV light   Triamcinolone cream 0.1 % cream twice daily until resolved   Return in 1 week for follow up

## 2021-07-15 NOTE — LETTER
"EMPLOYEE’S CLAIM FOR COMPENSATION/ REPORT OF INITIAL TREATMENT  FORM C-4    EMPLOYEE’S CLAIM - PROVIDE ALL INFORMATION REQUESTED   First Name  Hector Last Name  Mary Birthdate                    1965                Sex  male Claim Number   Home Address  [address] Age  54 y.o. Height  1.854 m (6' 1\") Weight  108.9 kg (240 lb) [facility]     [facility] Zip  [address] Telephone  [phone] (home)    Mailing Address  [address] [facility] Zip  [address] Primary Language Spoken  English    Insurer   Third Party   [employer]   Employee's Occupation (Job Title) When Injury or Occupational Disease Occurred      Employer's Name  [employer] [facility] [employer] Telephone  [phone]    Employer Address  [address]  [facility]  Zip  [address]    Date of Injury  2/22/2020               Hour of Injury  12:15 PM Date Employer Notified  2/22/2020 Last Day of Work after Injury or Occupational Disease  2/22/2020 Supervisor to Whom Injury Reported  ANGELA   Address or Location of Accident (if applicable)  [[address]   What were you doing at the time of accident? (if applicable)  WORKING    How did this injury or occupational disease occur? (Be specific an answer in detail. Use additional sheet if necessary)  REMOVING BATTORY CELL FROM MACHINE AND GOT CUT BY BLADE ON PINKY FINGER   If you believe that you have an occupational disease, when did you first have knowledge of the disability and it relationship to your employment?  N/A Witnesses to the Accident  SAMEER/IMHIR      Nature of Injury or Occupational Disease  Puncture  Part(s) of Body Injured or Affected  Finger (L), N/A, N/A    I certify that the above is true and correct to the best of my knowledge and that I have provided this information in order to obtain the benefits " Post-op instructions for Shoulder Rotator Cuff Repair / Labral Repair / Stabilization   Day of Surgery:     DIET:   Begin with liquids and light foods (jell-o, soup, etc.). Progress to your normal diet if you are not nauseated. MEDICATION:   1. Pain- Norco (hydrocodone/acetaminophen) or Oxycodone. If you are taking oxycodone, you may also take two (2) Tylenol (acetaminophen) 500mg tabs every eight (8) hours. Do not take Tylenol (acetaminophen) if you are given Norco as this medicine already contains acetaminophen. Do not drink alcohol while taking pain medication. Slowly wean off the pain medication as the pain improves. 2. Stool Softener-Pain medication can cause constipation. Be sure to drink plenty of water and take an over the counter stool softener as needed. ICE:  Do NOT put the ice machine directly on your skin. Use it frequently for the first 72 hours. You may also use a regular ice bag/pack for 20 minutes at a time. Place a thin layer of clothing or pillow case between ice pack and your skin. Ice for 20-30 minutes on and then 20-30 minutes off. SHOWERING:  No showering. Leave bandages in place. If given a sling, wear it. ACTIVITY:  You may move your hand and wrist, opening and closing your fist.      First & Second Post-Op Day:    MEDICATION: Continue as instructed as above. BANDAGES: No showering. Keep bandage in place. EXERCISES: Begin with 3 sets of 10 of the following exercises:  1. Scapular retractions: squeeze shoulder blades together and hold for 1-2 seconds. 2. Move wrist up and down. 3. Open / close fist.   4. Bend and extend elbow (you may loosen the sling for this exercise). ICE: Continue to use the ice machine frequently as instructed above. Place a thin layer of clothing or pillow case between ice pack and your skin. Ice for 20-30 minutes on and then 20-30 minutes off. Third Post-Op Day:    MEDICATION:  Continue as instructed above.      BANDAGES:   (after 72 hours/3 days): Remove outer bandages. Leave steri-strips in place. You may shower, but keep the incisions as dry as possible (cover with plastic wrap). It is best to sit down in the shower to avoid slipping. You may remove the sling for showers, but keep your operative arm in front of your body and DO NOT use the arm while showering. EXERCISES:    Continue exercises as noted above. ICE:   Continue to ice frequently eight with the ice machine or regular ice pack. Do not put it directly on your skin. Place a thin layer of clothing or pillow case between ice pack and your skin. Ice for 20-30 minutes on and then 20-30 minutes off. PHYSICAL THERAPY APPOINTMENT:  Formal physical therapy typically begins about 2-4 weeks post-op. SHOULDER RESPONSE TO SURGERY:  Your shoulder will be swollen. It may take a week or longer for this to resolve. It is common to notice bruising around the shoulder, upper arm, and into the elbow. Call with any problems or questions. [phone] if you have any questions or problems. Please contact us immediately if you notice fever greater than 101 degrees F, excessive bleeding, or drainage from the surgery site, calf pain, or shortness of breath. If you are calling after hours or on a weekend, you may receive a call back from the \"on call\" physician. What to Expect After a Nerve Block  Nerve blocks affect many types of nerves. The affected nerves control movement, pain, and normal sensation. This causes feelings such as:    · Weakness  · Numbness  · Tingling  · Heaviness  · A feeling that your arm or leg has \"fallen asleep\"    A nerve block can last for 24-48 hours, depending on the medications used. Usually the weakness wears off first, and then you feel a numb/tingly sensation. Finally, the pain may come back. This can happen in any order.     If you had a shoulder block, you may have other symptoms such as:    · Mild shortness of breath  · A hoarse of [place] and [place] or Chapter 617 of [place]).  I hereby authorize any physician, chiropractor, surgeon, practitioner, or other person, any hospital, including [facility] or [facility], any medical service organization, any insurance company, or other institution or organization to release to each other, any medical or other information, including benefits paid or payable, pertinent to this injury or disease, except information relative to diagnosis, treatment and/or counseling for AIDS, psychological conditions, alcohol or controlled substances, for which I must give specific authorization.  A Photostat of this authorization shall be as valid as the original.     Date   Place   Employee’s Signature   THIS REPORT MUST BE COMPLETED AND MAILED WITHIN 3 WORKING DAYS OF TREATMENT   Place  [facility]  Name of Facility  [place]   Date  2/22/2020 Diagnosis  (S69.92XA) Injury of left hand, initial encounter  (S61.217A) Laceration of left little finger without foreign body without damage to nail, initial encounter  (S61.215A) Laceration of left ring finger without foreign body without damage to nail, initial encounter Is there evidence the injured employee was under the influence of alcohol and/or another controlled substance at the time of accident?   Hour  4:46 PM Description of Injury or Disease  Diagnoses of Injury of left hand, initial encounter, Laceration of left little finger without foreign body without damage to nail, initial encounter, and Laceration of left ring finger without foreign body without damage to nail, initial encounter were pertinent to this visit.     Treatment  steristrip and finger splint  Have you advised the patient to remain off work five days or more? No   X-Ray Findings      If Yes   From Date  To Date      From information given by the employee, together with medical evidence, can you  "directly connect this injury or occupational disease as job incurred?  Yes If No Full Duty No Modified Duty  Yes   Is additional medical care by a physician indicated?  Yes If Modified Duty, Specify any Limitations / Restrictions  No use of left hand for 3 days due to the laceration and to let it heal   Do you know of any previous injury or disease contributing to this condition or occupational disease?                            No   Date  2/22/2020 Print Doctor’s Name Helder Ren M.D. I certify the employer’s copy of  this form was mailed on:   Address  [address] Insurer’s Use Only     [facility] Zip  [address]    Provider’s Tax ID Number  [id number] Telephone  Dept: [phone]        gunner-HELDER Dubon M.D.   e-Signature: Dr. Erick Sandhu,   Medical Director Degree  MD        ORIGINAL-TREATING PHYSICIAN OR CHIROPRACTOR    PAGE 2-INSURER/TPA    PAGE 3-EMPLOYER    PAGE 4-EMPLOYEE             Form C-4 (rev.10/07)              BRIEF DESCRIPTION OF RIGHTS AND BENEFITS  (Pursuant to [place] 616C.[place])    Notice of Injury or Occupational Disease (Incident Report Form C-1): If an injury or occupational disease (OD) arises out of and in the course of employment, you must provide written notice to your employer as soon as practicable, but no later than 7 days after the accident or OD. Your employer shall maintain a sufficient supply of the required forms.    Claim for Compensation (Form C-4): If medical treatment is sought, the form C-4 is available at the place of initial treatment. A completed \"Claim for Compensation\" (Form C-4) must be filed within 90 days after an accident or OD. The treating physician or chiropractor must, within 3 working days after treatment, complete and mail to the employer, the employer's insurer and third-party , the Claim for Compensation.    Medical Treatment: If you require medical treatment for your on-the-job injury or OD, you may be required to select a " voice  · Blurry vision  · Unequal pupils  · Drooping of your face on the same side as the nerve block    These are common and expected side effects of this type of nerve block. Symptoms usually go away within 12 hours. If these symptoms do not go away, please call the Anesthesiology Department at [phone] and ask for Anesthesiologist on call. If you have severe or prolonged shortness of breath, please go to the nearest emergency room. If you continue to feel the effects of the nerve block for longer than 48 hours, please call the Anesthesiology Department at [phone]. Pain Medication  If needed, your surgeon will give you a prescription for pain medication. Nerve blocks sometimes wear off during the night. It is a good idea to take your pain medicine before going to sleep so you won't wake up with pain. The idea is to have pain medicine in your body before the nerve block wears off. To help prevent nausea, eat something before taking the pain medicine. Once a nerve block starts to wear off, it is usually completely gone within 60 minutes. It is important to have pain medicine in your system before the block wears off completely. Helpful Tips to Protect the Part of Your Body That Is Numb  After a nerve block, you cannot feel pain, pressure, or extremes in temperature. Because your arm or leg is numb, it is more at risk for injury. For example, if working near a hot oven, you could burn your arm or leg without knowing it. Cont'd  Helpful Tips to Protect the Part of Your Body That Is Numb    Here are some hints to help protect your limb while it is numb:    · While you are awake, try to change positions of your arm or leg often. This will help you avoid putting too much pressure on the limb for long periods of time. · While sleeping, pad the blocked limb with pillows to avoid placing too much pressure on the limb.     · If you have a cast or a tight dressing, check the color of your finger/toes every couple hours. Call your doctor if they look discolored. · If you had a shoulder, arm, or hand nerve block, you may go home with a sling. The sling will help to keep your arm in the ideal position. Wear the sling at all times until feeling returns. If you do not have a sling, watch the position of the blocked arm to make sure it is in a safe location. · If you had a leg or foot block, walk with crutches and assistance until the block wears off completely. Bearing weight on a partially numb leg may result in a fall and further injury. · Ask your family or support people to help with the above hints. ACTIVITY  · As tolerated and as directed by your doctor. · Bathe or shower as directed by your doctor. DIET  · Clear liquids until no nausea or vomiting; then light diet for the first day. · Advance to regular diet on second day, unless your doctor orders otherwise. · If nausea and vomiting continues, call your doctor. PAIN  · Take pain medication as directed by your doctor. · Call your doctor if pain is NOT relieved by medication. · DO NOT take aspirin of blood thinners unless directed by your doctor. CALL YOUR DOCTOR IF   · Excessive bleeding that does not stop after holding pressure over the area  · Temperature of 101 degrees F or above  · Excessive redness, swelling or bruising, and/ or green or yellow, smelly discharge from incision    AFTER ANESTHESIA   · For the first 24 hours: DO NOT Drive, Drink alcoholic beverages, or Make important decisions. · Be aware of dizziness following anesthesia and while taking pain medication.          DISCHARGE SUMMARY from Nurse    PATIENT INSTRUCTIONS:    After general anesthesia or intravenous sedation, for 24 hours or while taking prescription Narcotics:  · Limit your activities  · Do not drive and operate hazardous machinery  · Do not make important personal or business decisions  · Do  not drink alcoholic physician or chiropractor from a list provided by your workers’ compensation insurer, if it has contracted with an Organization for Managed Care (MCO) or Preferred Provider Organization (PPO) or providers of health care. If your employer has not entered into a contract with an MCO or PPO, you may select a physician or chiropractor from the Panel of Physicians and Chiropractors. Any medical costs related to your industrial injury or OD will be paid by your insurer.    Temporary Total Disability (TTD): If your doctor has certified that you are unable to work for a period of at least 5 consecutive days, or 5 cumulative days in a 20-day period, or places restrictions on you that your employer does not accommodate, you may be entitled to TTD compensation.    Temporary Partial Disability (TPD): If the wage you receive upon reemployment is less than the compensation for TTD to which you are entitled, the insurer may be required to pay you TPD compensation to make up the difference. TPD can only be paid for a maximum of 24 months.    Permanent Partial Disability (PPD): When your medical condition is stable and there is an indication of a PPD as a result of your injury or OD, within 30 days, your insurer must arrange for an evaluation by a rating physician or chiropractor to determine the degree of your PPD. The amount of your PPD award depends on the date of injury, the results of the PPD evaluation and your age and wage.    Permanent Total Disability (PTD): If you are medically certified by a treating physician or chiropractor as permanently and totally disabled and have been granted a PTD status by your insurer, you are entitled to receive monthly benefits not to exceed 66 2/3% of your average monthly wage. The amount of your PTD payments is subject to reduction if you previously received a PPD award.    Vocational Rehabilitation Services: You may be eligible for vocational rehabilitation services if you are unable to  beverages  · If you have not urinated within 8 hours after discharge, please contact your surgeon on call. *  Please give a list of your current medications to your Primary Care Provider. *  Please update this list whenever your medications are discontinued, doses are      changed, or new medications (including over-the-counter products) are added. *  Please carry medication information at all times in case of emergency situations. These are general instructions for a healthy lifestyle:    No smoking/ No tobacco products/ Avoid exposure to second hand smoke    Surgeon General's Warning:  Quitting smoking now greatly reduces serious risk to your health. Obesity, smoking, and sedentary lifestyle greatly increases your risk for illness    A healthy diet, regular physical exercise & weight monitoring are important for maintaining a healthy lifestyle    You may be retaining fluid if you have a history of heart failure or if you experience any of the following symptoms:  Weight gain of 3 pounds or more overnight or 5 pounds in a week, increased swelling in our hands or feet or shortness of breath while lying flat in bed. Please call your doctor as soon as you notice any of these symptoms; do not wait until your next office visit. Recognize signs and symptoms of STROKE:    F-face looks uneven    A-arms unable to move or move unevenly    S-speech slurred or non-existent    T-time-call 911 as soon as signs and symptoms begin-DO NOT go       Back to bed or wait to see if you get better-TIME IS BRAIN. Learning About COVID-19 and Social Distancing  What is it? Social distancing means putting space between yourself and other people. The recommended distance is 6 feet, or about 2 meters. This also means staying away from any place where people may gather, such as [place] or other public gathering places. Why is it important? Social distancing is the best way to reduce the spread of COVID-19.  This return to the job due to a permanent physical impairment or permanent restrictions as a result of your injury or occupational disease.    Transportation and Per Rosenda Reimbursement: You may be eligible for travel expenses and per rosenda associated with medical treatment.    Reopening: You may be able to reopen your claim if your condition worsens after claim closure.    Appeal Process: If you disagree with a written determination issued by the insurer or the insurer does not respond to your request, you may appeal to the Department of Administration, , by following the instructions contained in your determination letter. You must appeal the determination within 70 days from the date of the determination letter at [address], [address], [place], [place] [address], or [address], [place], [place] [address]. If you disagree with the  decision, you may appeal to the Department of Administration, . You must file your appeal within 30 days from the date of the  decision letter at [address], [address], [place], [place] [address], or [address], [place] [address]. If you disagree with a decision of an , you may file a petition for judicial review with the District Court. You must do so within 30 days of the Appeal Officer’s decision. You may be represented by an  at your own expense or you may contact the [facility] for possible representation.    [place]  for Injured Workers (NAIW): If you disagree with a  decision, you may request that NAIW represent you without charge at an  Hearing. For information regarding denial of benefits, you may contact the [facility] at: [address], [address], [place] [address], [phone], or [address], [address], [place] [address], [phone]    To File a Complaint with the Division: If  virus seems to spread from person to person through droplets from coughing and sneezing. So if you keep your distance from others, you're less likely to get it or spread it. And social distancing is important for everyone, not just those who are at high risk of infection, like older people. You might have the virus but not have symptoms. You could then give the infection to someone you come into contact with. How is it done? Putting 6 feet, or about 2 meters, between you and other people is the recommended distance. Also stay away from any place where people may gather, such as [place] or other public gathering places. So if possible:  · Work from home, and keep your kids at home. · Don't travel if you don't have to. And avoid public transportation, ride-shares, and taxis unless you have no choice. · Limit shopping to essentials, like food and medicines. · Wear a cloth face cover if you have to go to a public place like the grocery store or pharmacy. · Don't eat in restaurants. (You can still get takeout or food deliveries.)  · Avoid crowds and busy places. Follow stay-at-home orders or other directions for your area. Where can you learn more? Go to http://www.gray.com/  Enter A133 in the search box to learn more about \"Learning About COVID-19 and Social Distancing. \"  Current as of: December 18, 2020               Content Version: 12.8  © 5887-3878 Healthwise, Incorporated. Care instructions adapted under license by [employer] (which disclaims liability or warranty for this information). If you have questions about a medical condition or this instruction, always ask your healthcare professional. [place] [address] any warranty or liability for your use of this information. you wish to file a complaint with the  of the Division of Industrial Relations (DIR),  please contact the Workers’ Compensation Section, [address], [address], [place], [place] [address], telephone [phone], or [address], [address], [place], [place] [address], telephone [phone].    For assistance with Workers’ Compensation Issues: You may contact the Office of the Governor Consumer Health Assistance, [address] [place] [address], [address], [place], [place] [address], Toll Free 1-910.130.1378, Web site: http://[employer].[facility].nv., E-mail [email or web address].[email or web address].                   __________________________________________________________________                                                     _________        Employee Name / Signature                                                                                                                                              Date                                                                                                                                                                                                     D-2 (rev. 06/18)

## 2022-07-09 ENCOUNTER — HOSPITAL ENCOUNTER (OUTPATIENT)
Facility: MEDICAL CENTER | Age: 57
End: 2022-07-10
Attending: EMERGENCY MEDICINE | Admitting: STUDENT IN AN ORGANIZED HEALTH CARE EDUCATION/TRAINING PROGRAM
Payer: COMMERCIAL

## 2022-07-09 ENCOUNTER — APPOINTMENT (OUTPATIENT)
Dept: RADIOLOGY | Facility: MEDICAL CENTER | Age: 57
End: 2022-07-09
Attending: STUDENT IN AN ORGANIZED HEALTH CARE EDUCATION/TRAINING PROGRAM
Payer: COMMERCIAL

## 2022-07-09 ENCOUNTER — APPOINTMENT (OUTPATIENT)
Dept: RADIOLOGY | Facility: MEDICAL CENTER | Age: 57
End: 2022-07-09
Attending: EMERGENCY MEDICINE
Payer: COMMERCIAL

## 2022-07-09 ENCOUNTER — APPOINTMENT (OUTPATIENT)
Dept: CARDIOLOGY | Facility: MEDICAL CENTER | Age: 57
End: 2022-07-09
Attending: STUDENT IN AN ORGANIZED HEALTH CARE EDUCATION/TRAINING PROGRAM
Payer: COMMERCIAL

## 2022-07-09 DIAGNOSIS — R00.2 PALPITATIONS: ICD-10-CM

## 2022-07-09 DIAGNOSIS — R79.89 ELEVATED TROPONIN: ICD-10-CM

## 2022-07-09 DIAGNOSIS — I47.10 SVT (SUPRAVENTRICULAR TACHYCARDIA) (HCC): ICD-10-CM

## 2022-07-09 PROBLEM — R07.89 CHEST PAIN, ATYPICAL: Status: ACTIVE | Noted: 2022-07-09

## 2022-07-09 PROBLEM — R07.9 CHEST PAIN: Status: ACTIVE | Noted: 2022-07-09

## 2022-07-09 PROBLEM — I30.0 ACUTE IDIOPATHIC PERICARDITIS: Status: ACTIVE | Noted: 2022-07-09

## 2022-07-09 PROBLEM — R74.01 TRANSAMINITIS: Status: ACTIVE | Noted: 2022-07-09

## 2022-07-09 PROBLEM — I10 HYPERTENSION: Status: ACTIVE | Noted: 2022-07-09

## 2022-07-09 PROBLEM — E87.8 LOW BICARBONATE: Status: ACTIVE | Noted: 2022-07-09

## 2022-07-09 LAB
ALBUMIN SERPL BCP-MCNC: 4.4 G/DL (ref 3.2–4.9)
ALBUMIN/GLOB SERPL: 1.4 G/DL
ALP SERPL-CCNC: 95 U/L (ref 30–99)
ALT SERPL-CCNC: 79 U/L (ref 2–50)
ANION GAP SERPL CALC-SCNC: 12 MMOL/L (ref 7–16)
AST SERPL-CCNC: 63 U/L (ref 12–45)
BASOPHILS # BLD AUTO: 0.8 % (ref 0–1.8)
BASOPHILS # BLD: 0.06 K/UL (ref 0–0.12)
BILIRUB SERPL-MCNC: 0.2 MG/DL (ref 0.1–1.5)
BUN SERPL-MCNC: 16 MG/DL (ref 8–22)
CALCIUM SERPL-MCNC: 9.1 MG/DL (ref 8.5–10.5)
CHLORIDE SERPL-SCNC: 107 MMOL/L (ref 96–112)
CO2 SERPL-SCNC: 19 MMOL/L (ref 20–33)
CREAT SERPL-MCNC: 1.23 MG/DL (ref 0.5–1.4)
CRP SERPL HS-MCNC: <0.3 MG/DL (ref 0–0.75)
EKG IMPRESSION: NORMAL
EOSINOPHIL # BLD AUTO: 0.24 K/UL (ref 0–0.51)
EOSINOPHIL NFR BLD: 3.1 % (ref 0–6.9)
ERYTHROCYTE [DISTWIDTH] IN BLOOD BY AUTOMATED COUNT: 42.4 FL (ref 35.9–50)
ERYTHROCYTE [SEDIMENTATION RATE] IN BLOOD BY WESTERGREN METHOD: 9 MM/HOUR (ref 0–20)
GFR SERPLBLD CREATININE-BSD FMLA CKD-EPI: 69 ML/MIN/1.73 M 2
GLOBULIN SER CALC-MCNC: 3.1 G/DL (ref 1.9–3.5)
GLUCOSE SERPL-MCNC: 105 MG/DL (ref 65–99)
HAV IGM SERPL QL IA: NORMAL
HBV CORE IGM SER QL: NORMAL
HBV SURFACE AG SER QL: NORMAL
HCT VFR BLD AUTO: 44.6 % (ref 42–52)
HCV AB SER QL: NORMAL
HGB BLD-MCNC: 14.8 G/DL (ref 14–18)
IMM GRANULOCYTES # BLD AUTO: 0.03 K/UL (ref 0–0.11)
IMM GRANULOCYTES NFR BLD AUTO: 0.4 % (ref 0–0.9)
LACTATE SERPL-SCNC: 1.2 MMOL/L (ref 0.5–2)
LACTATE SERPL-SCNC: 1.6 MMOL/L (ref 0.5–2)
LACTATE SERPL-SCNC: 2.2 MMOL/L (ref 0.5–2)
LACTATE SERPL-SCNC: 2.5 MMOL/L (ref 0.5–2)
LV EJECT FRACT  99904: 60
LV EJECT FRACT MOD 2C 99903: 59.42
LV EJECT FRACT MOD 4C 99902: 49.43
LV EJECT FRACT MOD BP 99901: 55.62
LYMPHOCYTES # BLD AUTO: 2.07 K/UL (ref 1–4.8)
LYMPHOCYTES NFR BLD: 26.8 % (ref 22–41)
MCH RBC QN AUTO: 28.4 PG (ref 27–33)
MCHC RBC AUTO-ENTMCNC: 33.2 G/DL (ref 33.7–35.3)
MCV RBC AUTO: 85.6 FL (ref 81.4–97.8)
MONOCYTES # BLD AUTO: 0.51 K/UL (ref 0–0.85)
MONOCYTES NFR BLD AUTO: 6.6 % (ref 0–13.4)
NEUTROPHILS # BLD AUTO: 4.82 K/UL (ref 1.82–7.42)
NEUTROPHILS NFR BLD: 62.3 % (ref 44–72)
NRBC # BLD AUTO: 0 K/UL
NRBC BLD-RTO: 0 /100 WBC
NT-PROBNP SERPL IA-MCNC: 14 PG/ML (ref 0–125)
PLATELET # BLD AUTO: 226 K/UL (ref 164–446)
PMV BLD AUTO: 9.6 FL (ref 9–12.9)
POTASSIUM SERPL-SCNC: 4.3 MMOL/L (ref 3.6–5.5)
PROCALCITONIN SERPL-MCNC: 0.07 NG/ML
PROT SERPL-MCNC: 7.5 G/DL (ref 6–8.2)
RBC # BLD AUTO: 5.21 M/UL (ref 4.7–6.1)
SODIUM SERPL-SCNC: 138 MMOL/L (ref 135–145)
TROPONIN T SERPL-MCNC: 169 NG/L (ref 6–19)
TROPONIN T SERPL-MCNC: 42 NG/L (ref 6–19)
TSH SERPL DL<=0.005 MIU/L-ACNC: 1.61 UIU/ML (ref 0.38–5.33)
WBC # BLD AUTO: 7.7 K/UL (ref 4.8–10.8)

## 2022-07-09 PROCEDURE — 93005 ELECTROCARDIOGRAM TRACING: CPT | Performed by: STUDENT IN AN ORGANIZED HEALTH CARE EDUCATION/TRAINING PROGRAM

## 2022-07-09 PROCEDURE — 85652 RBC SED RATE AUTOMATED: CPT

## 2022-07-09 PROCEDURE — 96372 THER/PROPH/DIAG INJ SC/IM: CPT

## 2022-07-09 PROCEDURE — A9270 NON-COVERED ITEM OR SERVICE: HCPCS | Performed by: STUDENT IN AN ORGANIZED HEALTH CARE EDUCATION/TRAINING PROGRAM

## 2022-07-09 PROCEDURE — 84443 ASSAY THYROID STIM HORMONE: CPT

## 2022-07-09 PROCEDURE — 80074 ACUTE HEPATITIS PANEL: CPT

## 2022-07-09 PROCEDURE — 93005 ELECTROCARDIOGRAM TRACING: CPT

## 2022-07-09 PROCEDURE — 84145 PROCALCITONIN (PCT): CPT

## 2022-07-09 PROCEDURE — 84484 ASSAY OF TROPONIN QUANT: CPT

## 2022-07-09 PROCEDURE — 86140 C-REACTIVE PROTEIN: CPT

## 2022-07-09 PROCEDURE — 93306 TTE W/DOPPLER COMPLETE: CPT | Mod: 26 | Performed by: INTERNAL MEDICINE

## 2022-07-09 PROCEDURE — 71045 X-RAY EXAM CHEST 1 VIEW: CPT

## 2022-07-09 PROCEDURE — 83605 ASSAY OF LACTIC ACID: CPT | Mod: 91

## 2022-07-09 PROCEDURE — 93005 ELECTROCARDIOGRAM TRACING: CPT | Performed by: EMERGENCY MEDICINE

## 2022-07-09 PROCEDURE — 83880 ASSAY OF NATRIURETIC PEPTIDE: CPT

## 2022-07-09 PROCEDURE — 80053 COMPREHEN METABOLIC PANEL: CPT

## 2022-07-09 PROCEDURE — G0378 HOSPITAL OBSERVATION PER HR: HCPCS

## 2022-07-09 PROCEDURE — 93306 TTE W/DOPPLER COMPLETE: CPT

## 2022-07-09 PROCEDURE — 700102 HCHG RX REV CODE 250 W/ 637 OVERRIDE(OP): Performed by: STUDENT IN AN ORGANIZED HEALTH CARE EDUCATION/TRAINING PROGRAM

## 2022-07-09 PROCEDURE — 700111 HCHG RX REV CODE 636 W/ 250 OVERRIDE (IP): Performed by: STUDENT IN AN ORGANIZED HEALTH CARE EDUCATION/TRAINING PROGRAM

## 2022-07-09 PROCEDURE — 85025 COMPLETE CBC W/AUTO DIFF WBC: CPT

## 2022-07-09 PROCEDURE — 700105 HCHG RX REV CODE 258: Performed by: STUDENT IN AN ORGANIZED HEALTH CARE EDUCATION/TRAINING PROGRAM

## 2022-07-09 PROCEDURE — 99285 EMERGENCY DEPT VISIT HI MDM: CPT

## 2022-07-09 PROCEDURE — 99204 OFFICE O/P NEW MOD 45 MIN: CPT | Mod: 25 | Performed by: INTERNAL MEDICINE

## 2022-07-09 PROCEDURE — 36415 COLL VENOUS BLD VENIPUNCTURE: CPT

## 2022-07-09 PROCEDURE — 93010 ELECTROCARDIOGRAM REPORT: CPT | Performed by: INTERNAL MEDICINE

## 2022-07-09 PROCEDURE — 99220 PR INITIAL OBSERVATION CARE,LEVL III: CPT | Performed by: STUDENT IN AN ORGANIZED HEALTH CARE EDUCATION/TRAINING PROGRAM

## 2022-07-09 PROCEDURE — 76705 ECHO EXAM OF ABDOMEN: CPT

## 2022-07-09 RX ORDER — ASPIRIN 325 MG
325 TABLET ORAL DAILY
Status: DISCONTINUED | OUTPATIENT
Start: 2022-07-09 | End: 2022-07-09

## 2022-07-09 RX ORDER — IBUPROFEN 600 MG/1
600 TABLET ORAL 3 TIMES DAILY
Status: DISCONTINUED | OUTPATIENT
Start: 2022-07-09 | End: 2022-07-10 | Stop reason: HOSPADM

## 2022-07-09 RX ORDER — PROMETHAZINE HYDROCHLORIDE 25 MG/1
12.5-25 TABLET ORAL EVERY 4 HOURS PRN
Status: DISCONTINUED | OUTPATIENT
Start: 2022-07-09 | End: 2022-07-10 | Stop reason: HOSPADM

## 2022-07-09 RX ORDER — OMEPRAZOLE 20 MG/1
20 CAPSULE, DELAYED RELEASE ORAL DAILY
Status: DISCONTINUED | OUTPATIENT
Start: 2022-07-09 | End: 2022-07-10 | Stop reason: HOSPADM

## 2022-07-09 RX ORDER — ONDANSETRON 4 MG/1
4 TABLET, ORALLY DISINTEGRATING ORAL EVERY 4 HOURS PRN
Status: DISCONTINUED | OUTPATIENT
Start: 2022-07-09 | End: 2022-07-10 | Stop reason: HOSPADM

## 2022-07-09 RX ORDER — HEPARIN SODIUM 5000 [USP'U]/ML
5000 INJECTION, SOLUTION INTRAVENOUS; SUBCUTANEOUS EVERY 8 HOURS
Status: DISCONTINUED | OUTPATIENT
Start: 2022-07-09 | End: 2022-07-10 | Stop reason: HOSPADM

## 2022-07-09 RX ORDER — POLYETHYLENE GLYCOL 3350 17 G/17G
1 POWDER, FOR SOLUTION ORAL
Status: DISCONTINUED | OUTPATIENT
Start: 2022-07-09 | End: 2022-07-10 | Stop reason: HOSPADM

## 2022-07-09 RX ORDER — HYDROCHLOROTHIAZIDE 12.5 MG/1
12.5 TABLET ORAL DAILY
COMMUNITY

## 2022-07-09 RX ORDER — LABETALOL HYDROCHLORIDE 5 MG/ML
10 INJECTION, SOLUTION INTRAVENOUS EVERY 4 HOURS PRN
Status: DISCONTINUED | OUTPATIENT
Start: 2022-07-09 | End: 2022-07-10 | Stop reason: HOSPADM

## 2022-07-09 RX ORDER — PROMETHAZINE HYDROCHLORIDE 25 MG/1
12.5-25 SUPPOSITORY RECTAL EVERY 4 HOURS PRN
Status: DISCONTINUED | OUTPATIENT
Start: 2022-07-09 | End: 2022-07-10 | Stop reason: HOSPADM

## 2022-07-09 RX ORDER — BISACODYL 10 MG
10 SUPPOSITORY, RECTAL RECTAL
Status: DISCONTINUED | OUTPATIENT
Start: 2022-07-09 | End: 2022-07-10 | Stop reason: HOSPADM

## 2022-07-09 RX ORDER — AMLODIPINE BESYLATE 5 MG/1
5 TABLET ORAL
Status: DISCONTINUED | OUTPATIENT
Start: 2022-07-09 | End: 2022-07-10 | Stop reason: HOSPADM

## 2022-07-09 RX ORDER — BENAZEPRIL HYDROCHLORIDE 20 MG/1
20 TABLET ORAL DAILY
COMMUNITY

## 2022-07-09 RX ORDER — ASPIRIN 81 MG/1
324 TABLET, CHEWABLE ORAL DAILY
Status: DISCONTINUED | OUTPATIENT
Start: 2022-07-09 | End: 2022-07-09

## 2022-07-09 RX ORDER — ASPIRIN 300 MG/1
300 SUPPOSITORY RECTAL DAILY
Status: DISCONTINUED | OUTPATIENT
Start: 2022-07-09 | End: 2022-07-09

## 2022-07-09 RX ORDER — ASPIRIN 325 MG
325 TABLET ORAL ONCE
Status: COMPLETED | OUTPATIENT
Start: 2022-07-09 | End: 2022-07-09

## 2022-07-09 RX ORDER — SODIUM CHLORIDE 9 MG/ML
INJECTION, SOLUTION INTRAVENOUS CONTINUOUS
Status: DISCONTINUED | OUTPATIENT
Start: 2022-07-09 | End: 2022-07-10 | Stop reason: HOSPADM

## 2022-07-09 RX ORDER — COLCHICINE 0.6 MG/1
0.6 TABLET ORAL 2 TIMES DAILY
Status: COMPLETED | OUTPATIENT
Start: 2022-07-09 | End: 2022-07-10

## 2022-07-09 RX ORDER — AMOXICILLIN 250 MG
2 CAPSULE ORAL 2 TIMES DAILY
Status: DISCONTINUED | OUTPATIENT
Start: 2022-07-09 | End: 2022-07-10 | Stop reason: HOSPADM

## 2022-07-09 RX ORDER — FENOFIBRATE 160 MG/1
160 TABLET ORAL DAILY
COMMUNITY

## 2022-07-09 RX ORDER — ONDANSETRON 2 MG/ML
4 INJECTION INTRAMUSCULAR; INTRAVENOUS EVERY 4 HOURS PRN
Status: DISCONTINUED | OUTPATIENT
Start: 2022-07-09 | End: 2022-07-10 | Stop reason: HOSPADM

## 2022-07-09 RX ORDER — PROCHLORPERAZINE EDISYLATE 5 MG/ML
5-10 INJECTION INTRAMUSCULAR; INTRAVENOUS EVERY 4 HOURS PRN
Status: DISCONTINUED | OUTPATIENT
Start: 2022-07-09 | End: 2022-07-10 | Stop reason: HOSPADM

## 2022-07-09 RX ADMIN — IBUPROFEN 600 MG: 600 TABLET, FILM COATED ORAL at 12:55

## 2022-07-09 RX ADMIN — METOPROLOL TARTRATE 12.5 MG: 25 TABLET, FILM COATED ORAL at 12:55

## 2022-07-09 RX ADMIN — HEPARIN SODIUM 5000 UNITS: 5000 INJECTION, SOLUTION INTRAVENOUS; SUBCUTANEOUS at 23:37

## 2022-07-09 RX ADMIN — AMLODIPINE BESYLATE 5 MG: 5 TABLET ORAL at 12:56

## 2022-07-09 RX ADMIN — COLCHICINE 0.6 MG: 0.6 TABLET, FILM COATED ORAL at 21:07

## 2022-07-09 RX ADMIN — ASPIRIN 325 MG: 325 TABLET ORAL at 12:56

## 2022-07-09 RX ADMIN — SODIUM CHLORIDE: 9 INJECTION, SOLUTION INTRAVENOUS at 16:38

## 2022-07-09 RX ADMIN — OMEPRAZOLE 20 MG: 20 CAPSULE, DELAYED RELEASE ORAL at 12:56

## 2022-07-09 RX ADMIN — COLCHICINE 0.6 MG: 0.6 TABLET, FILM COATED ORAL at 12:56

## 2022-07-09 RX ADMIN — METOPROLOL TARTRATE 12.5 MG: 25 TABLET, FILM COATED ORAL at 19:14

## 2022-07-09 RX ADMIN — HEPARIN SODIUM 5000 UNITS: 5000 INJECTION, SOLUTION INTRAVENOUS; SUBCUTANEOUS at 16:38

## 2022-07-09 ASSESSMENT — ENCOUNTER SYMPTOMS
SHORTNESS OF BREATH: 0
FEVER: 0
COUGH: 0
BRUISES/BLEEDS EASILY: 0
NAUSEA: 0
BLURRED VISION: 0
DIZZINESS: 1
MYALGIAS: 0
VOMITING: 0
PALPITATIONS: 1

## 2022-07-09 ASSESSMENT — PATIENT HEALTH QUESTIONNAIRE - PHQ9
SUM OF ALL RESPONSES TO PHQ9 QUESTIONS 1 AND 2: 0
1. LITTLE INTEREST OR PLEASURE IN DOING THINGS: NOT AT ALL
2. FEELING DOWN, DEPRESSED, IRRITABLE, OR HOPELESS: NOT AT ALL

## 2022-07-09 ASSESSMENT — LIFESTYLE VARIABLES: ALCOHOL_USE: NO

## 2022-07-09 ASSESSMENT — FIBROSIS 4 INDEX: FIB4 SCORE: 1.76

## 2022-07-09 ASSESSMENT — PAIN DESCRIPTION - PAIN TYPE: TYPE: ACUTE PAIN

## 2022-07-09 NOTE — PROGRESS NOTES
Lab called with critical result of TROPONIN at 1555. Critical lab result read back to LAB    notified of critical lab result at 1606  Critical lab result read back by Dr. Hoffman,DDR.texted back to call cardiologist,cardiology paged.

## 2022-07-09 NOTE — ED NOTES
Pt transported up to T203-00 in wheelchair on monitor with 1ACLS RN and 1 CCT. All personal belongings taken with patient up to assigned room.

## 2022-07-09 NOTE — ED NOTES
Lactic acid drawn & sent.  Pt remains asymptomatic.  Pt has admit orders, awaiting a bed assignment.

## 2022-07-09 NOTE — H&P
Hospital Medicine History & Physical Note    Date of Service  7/9/2022    Primary Care Physician  Jose Duval M.D.    Consultants  cardiology    Specialist Names: Cardiology Dr chong      Code Status  No Order    Chief Complaint  Chief Complaint   Patient presents with   • Supraventricular Tachycardia (SVT)   • Chest Pain       History of Presenting Illness  Hector Hernandez is a 56 y.o. male with past medical history of hypertension, hyperlipidemia who presented 7/9/2022 with chest pain associated with diaphoresis, nausea, dizziness.    Patient reports earlier today he was at his work and had his coffee suddenly had substernal chest pain, non radiating, lasted for 30 min and felt  his heart was racing.  He went to a nearby clinic where they found out patient heart rate was in the range of 200.  EKG noted with SVT.  Patient received 6 mg of adenosine with significant improvement with conversion to normal sinus rhythm.  Reports his father had CAD and required CABG.    Denies fever , cough , Shortness of breathe .Reports he can walk multiple blocks without dyspnea.  Chest pain not associated with deep breath or change in poison.    No change in bowel and bladder habit , no change in weight . Denies abdominal pain , no melena . No rash /ulcer. Denies weakness/numbness,visual changes ,headache ,syncope.      Reports his father had CAD and required CAB    On arrival to ED patient vitals remained stable, saturating over 96% on room air, blood pressure stable, heart rate 91.  Labs noted with bicarbonate 19, glucose 105, elevated AST/ALT, troponin 42.  Chest x-ray unremarkable, EKG with diffuse ST elevation consistent with acute pericarditis.    Cardiology Dr chong  was consulted  Case endorsed to ED physician for admission for chest pain rule out ACS.        I discussed the plan of care with patient.    Review of Systems  Review of Systems   Constitutional: Negative for fever.   HENT: Negative for hearing loss.    Eyes:  Negative for blurred vision.   Respiratory: Negative for cough and shortness of breath.    Cardiovascular: Positive for chest pain and palpitations.   Gastrointestinal: Negative for nausea and vomiting.   Genitourinary: Negative for dysuria.   Musculoskeletal: Negative for myalgias.   Skin: Negative for rash.   Neurological: Positive for dizziness.   Endo/Heme/Allergies: Does not bruise/bleed easily.       Past Medical History   has a past medical history of Hypercholesterolemia and Hypertension.    Surgical History  Reviewed, no surgical history.    Family History    Family history reviewed with patient. There is family history that is pertinent to the chief complaint.  Family history of CAD    Social History   reports that he has never smoked. He has never used smokeless tobacco. He reports that he does not drink alcohol and does not use drugs.    Allergies  No Known Allergies    Medications  Prior to Admission Medications   Prescriptions Last Dose Informant Patient Reported? Taking?   amlodipine-benazepril (LOTREL) 10-20 MG per capsule   Yes No   Sig: Take 1 Cap by mouth.   gemfibrozil (LOPID) 600 MG Tab   Yes No   Sig: TAKE 1 TABLET BY MOUTH TWICE DAILY FOR 90 DAYS   methylPREDNISolone (MEDROL DOSEPAK) 4 MG Tablet Therapy Pack   No No   Sig: Follow schedule on package instructions.   triamcinolone acetonide (KENALOG) 0.1 % Ointment   No No   Sig: Apply 1 Application topically 2 times a day.      Facility-Administered Medications: None       Physical Exam  Temp:  [36.4 °C (97.5 °F)] 36.4 °C (97.5 °F)  Pulse:  [91] 91  BP: (121)/(83) 121/83  SpO2:  [96 %] 96 %  Blood Pressure: 121/83   Temperature: 36.4 °C (97.5 °F)   Pulse: 91       Pulse Oximetry: 96 %       Physical Exam  Constitutional:       General: He is not in acute distress.  HENT:      Head: Normocephalic and atraumatic.      Right Ear: Tympanic membrane normal.      Left Ear: Tympanic membrane normal.      Nose: Nose normal.      Mouth/Throat:       Mouth: Mucous membranes are moist.   Eyes:      Extraocular Movements: Extraocular movements intact.      Pupils: Pupils are equal, round, and reactive to light.   Cardiovascular:      Rate and Rhythm: Normal rate and regular rhythm.      Pulses: Normal pulses.   Pulmonary:      Effort: Pulmonary effort is normal. No respiratory distress.   Abdominal:      General: Abdomen is flat. There is no distension.   Musculoskeletal:      Cervical back: Normal range of motion.      Right lower leg: No edema.      Left lower leg: No edema.   Skin:     General: Skin is warm.   Neurological:      General: No focal deficit present.      Mental Status: He is alert and oriented to person, place, and time. Mental status is at baseline.   Psychiatric:         Mood and Affect: Mood normal.         Laboratory:  Recent Labs     07/09/22  1002   WBC 7.7   RBC 5.21   HEMOGLOBIN 14.8   HEMATOCRIT 44.6   MCV 85.6   MCH 28.4   MCHC 33.2*   RDW 42.4   PLATELETCT 226   MPV 9.6     Recent Labs     07/09/22  1002   SODIUM 138   POTASSIUM 4.3   CHLORIDE 107   CO2 19*   GLUCOSE 105*   BUN 16   CREATININE 1.23   CALCIUM 9.1     Recent Labs     07/09/22  1002   ALTSGPT 79*   ASTSGOT 63*   ALKPHOSPHAT 95   TBILIRUBIN 0.2   GLUCOSE 105*         No results for input(s): NTPROBNP in the last 72 hours.      Recent Labs     07/09/22  1002   TROPONINT 42*       Imaging:  DX-CHEST-PORTABLE (1 VIEW)   Final Result      No evidence of acute cardiopulmonary process.          X-Ray:  I have personally reviewed the images and compared with prior images.  EKG:  I have personally reviewed the images and compared with prior images.    Assessment/Plan:  Justification for Admission Status  I anticipate this patient is appropriate for observation status at this time because Chest pain, elevated troponin, EKG changes require close monitoring on telemetry    * Chest pain  Assessment & Plan  :   -Admit to telemetry  -Follow serial troponin/CK-MB/EKG  -Transthoracic  echocardiograph to identify regional wall motion abnormalities and assess LV function  -Stress test  -Aspirin 325 mg  -Cardiology consulted   -Check lipid panel  -Supplemental oxygen  -Smoking cessation        SVT (supraventricular tachycardia) (HCC)  Assessment & Plan  S/p 6 mg adenosine   Now on  normal sinus rhythm  Get echocardiogram  Will start on beta-blocker    Transaminitis  Assessment & Plan    Get right upper quad ultrasound  Get hepatitis panel  Avoid hepatotoxic medication  Monitor closely      Low bicarbonate  Assessment & Plan  Check lactic acid   continue monitor closely    Hypertension  Assessment & Plan  Current blood pressure acceptable  Continue home medication  On IV labetalol as needed  Monitor BP closely    Acute idiopathic pericarditis  Assessment & Plan  Symptoms inconsistent with pericarditis.  Patient denies any pain while taking deep breaths or lying flat.  EKG noted with ST changes in multiple lead  Get ECHO   Start on  Ibuprofen 600  mg TID daily - taper in 2- 4 weeks   Colchicine 0.6 mg BID for   Add PPI  Cardiology consulted for further recommendation          VTE prophylaxis: SCDs/TEDs and Xarelto 10 mg daily as prophylaxis

## 2022-07-09 NOTE — ASSESSMENT & PLAN NOTE
Get right upper quad ultrasound  Get hepatitis panel  Avoid hepatotoxic medication  Monitor closely

## 2022-07-09 NOTE — CONSULTS
Cardiology Initial Consult Note    DOS: 7/9/2022    Referring physician: Dr Hoffman    Chief complaint/Reason for consult: SVT    HPI: 57 y/o M with pSVT new diagnosis. Had acute onset palpitations and chest pain. EMS administered adenosine resulting in termination. No symptoms now.    ROS (+ highlighted in bold):  Constitutional: Fevers/chills/fatigue/weightloss  HEENT: Blurry vision/eye pain/sore throat/hearing loss  Respiratory: Shortness of breath/cough  Cardiovascular: Chest pain/palpitations/edema/orthopnea/syncope  GI: Nausea/vomitting/diarrhea  MSK: Arthralgias/myagias/muscle weakness  Skin: Rash/sores  Neurological: Numbness/tremors/vertigo  Endocrine: Excessive thirst/polyuria/cold intolerance/heat intolerance  Psych: Depression/anxiety    Past Medical History:   Diagnosis Date   • Hypercholesterolemia    • Hypertension        History reviewed. No pertinent surgical history.    Social History     Socioeconomic History   • Marital status:      Spouse name: Not on file   • Number of children: Not on file   • Years of education: Not on file   • Highest education level: Not on file   Occupational History   • Not on file   Tobacco Use   • Smoking status: Never Smoker   • Smokeless tobacco: Never Used   Vaping Use   • Vaping Use: Never used   Substance and Sexual Activity   • Alcohol use: Never   • Drug use: Never   • Sexual activity: Not on file   Other Topics Concern   • Not on file   Social History Narrative   • Not on file     Social Determinants of Health     Financial Resource Strain: Not on file   Food Insecurity: Not on file   Transportation Needs: Not on file   Physical Activity: Not on file   Stress: Not on file   Social Connections: Not on file   Intimate Partner Violence: Not on file   Housing Stability: Not on file       History reviewed. No pertinent family history.   Denies family history of premature CAD    No Known Allergies    Current Facility-Administered Medications   Medication Dose Route  Frequency Provider Last Rate Last Admin   • senna-docusate (PERICOLACE or SENOKOT S) 8.6-50 MG per tablet 2 Tablet  2 Tablet Oral BID Alonzo Hoffman M.D.        And   • polyethylene glycol/lytes (MIRALAX) PACKET 1 Packet  1 Packet Oral QDAY PRN Alonzo Hoffman M.D.        And   • magnesium hydroxide (MILK OF MAGNESIA) suspension 30 mL  30 mL Oral QDAY PRN Alonzo Hoffman M.D.        And   • bisacodyl (DULCOLAX) suppository 10 mg  10 mg Rectal QDAY PRN Alonzo Hoffman M.D.       • heparin injection 5,000 Units  5,000 Units Subcutaneous Q8HRS Alonzo Hoffman M.D.       • ondansetron (ZOFRAN) syringe/vial injection 4 mg  4 mg Intravenous Q4HRS PRN Alonzo Hoffman M.D.       • ondansetron (ZOFRAN ODT) dispertab 4 mg  4 mg Oral Q4HRS PRN Alonzo Hoffman M.D.       • promethazine (PHENERGAN) tablet 12.5-25 mg  12.5-25 mg Oral Q4HRS PRN Alonzo Hoffman M.D.       • promethazine (PHENERGAN) suppository 12.5-25 mg  12.5-25 mg Rectal Q4HRS PRN Alonzo Hoffman M.D.       • prochlorperazine (COMPAZINE) injection 5-10 mg  5-10 mg Intravenous Q4HRS PRN Alonzo Hoffman M.D.       • labetalol (NORMODYNE/TRANDATE) injection 10 mg  10 mg Intravenous Q4HRS PRN Alonzo Hoffman M.D.       • amLODIPine (NORVASC) tablet 5 mg  5 mg Oral Q DAY Alonzo Hoffman M.D.   5 mg at 07/09/22 1256   • ibuprofen (MOTRIN) tablet 600 mg  600 mg Oral TID Alonzo Hoffman M.D.   600 mg at 07/09/22 1255   • colchicine (COLCRYS) tablet 0.6 mg  0.6 mg Oral BID Alonzo Hoffman M.D.   0.6 mg at 07/09/22 1256   • omeprazole (PRILOSEC) capsule 20 mg  20 mg Oral DAILY Alonzo Hoffman M.D.   20 mg at 07/09/22 1256   • metoprolol tartrate (LOPRESSOR) tablet 12.5 mg  12.5 mg Oral TWICE DAILY Alonzo Hoffman M.D.   12.5 mg at 07/09/22 1255   • [START ON 7/10/2022] aspirin EC (ECOTRIN) tablet 81 mg  81 mg Oral DAILY Alonzo Hoffman M.D.       • NS infusion   Intravenous Continuous Alonzo Hoffman M.D.           Physical Exam:  Vitals:    07/09/22 1231 07/09/22 1301 07/09/22 1331 07/09/22 1410   BP: 136/79 127/82 133/80  "134/84   Pulse: 77 72 66 65   Resp:    18   Temp:    36.7 °C (98 °F)   TempSrc:    Temporal   SpO2: 95% 94% 96% 95%   Weight:    118 kg (259 lb 0.7 oz)   Height:    1.854 m (6' 1\")     General appearance: NAD, conversant   Eyes: anicteric sclerae, moist conjunctivae; no lid-lag; PERRLA  HENT: Atraumatic; oropharynx clear with moist mucous membranes and no mucosal ulcerations; normal hard and soft palate  Neck: Trachea midline; FROM, supple, no thyromegaly or lymphadenopathy  Lungs: CTA, with normal respiratory effort and no intercostal retractions  CV: RRR, no MRGs, no JVD   Abdomen: Soft, non-tender; no masses or HSM  Extremities: No peripheral edema or extremity lymphadenopathy  Skin: Normal temperature, turgor and texture; no rash, ulcers or subcutaneous nodules  Psych: Appropriate affect, alert and oriented to person, place and time    Data:  Lipids:   No results found for: CHOLSTRLTOT, TRIGLYCERIDE, HDL, LDL     BMP:  Lab Results   Component Value Date/Time    SODIUM 138 07/09/2022 1002    POTASSIUM 4.3 07/09/2022 1002    CHLORIDE 107 07/09/2022 1002    CO2 19 (L) 07/09/2022 1002    GLUCOSE 105 (H) 07/09/2022 1002    BUN 16 07/09/2022 1002    CREATININE 1.23 07/09/2022 1002    CALCIUM 9.1 07/09/2022 1002    ANION 12.0 07/09/2022 1002        TSH:   Lab Results   Component Value Date/Time    TSHULTRASEN 1.610 07/09/2022 1002        THYROXINE (T4):   No results found for: JADEIR     CBC:   Lab Results   Component Value Date/Time    WBC 7.7 07/09/2022 10:02 AM    RBC 5.21 07/09/2022 10:02 AM    HEMOGLOBIN 14.8 07/09/2022 10:02 AM    HEMATOCRIT 44.6 07/09/2022 10:02 AM    MCV 85.6 07/09/2022 10:02 AM    MCH 28.4 07/09/2022 10:02 AM    MCHC 33.2 (L) 07/09/2022 10:02 AM    RDW 42.4 07/09/2022 10:02 AM    PLATELETCT 226 07/09/2022 10:02 AM    MPV 9.6 07/09/2022 10:02 AM    NEUTSPOLYS 62.30 07/09/2022 10:02 AM    LYMPHOCYTES 26.80 07/09/2022 10:02 AM    MONOCYTES 6.60 07/09/2022 10:02 AM    EOSINOPHILS 3.10 07/09/2022 " 10:02 AM    BASOPHILS 0.80 07/09/2022 10:02 AM    IMMGRAN 0.40 07/09/2022 10:02 AM    NRBC 0.00 07/09/2022 10:02 AM    NEUTS 4.82 07/09/2022 10:02 AM    LYMPHS 2.07 07/09/2022 10:02 AM    MONOS 0.51 07/09/2022 10:02 AM    EOS 0.24 07/09/2022 10:02 AM    BASO 0.06 07/09/2022 10:02 AM    IMMGRANAB 0.03 07/09/2022 10:02 AM    NRBCAB 0.00 07/09/2022 10:02 AM        CBC w/o DIFF  Lab Results   Component Value Date/Time    WBC 7.7 07/09/2022 10:02 AM    RBC 5.21 07/09/2022 10:02 AM    HEMOGLOBIN 14.8 07/09/2022 10:02 AM    MCV 85.6 07/09/2022 10:02 AM    MCH 28.4 07/09/2022 10:02 AM    MCHC 33.2 (L) 07/09/2022 10:02 AM    RDW 42.4 07/09/2022 10:02 AM    MPV 9.6 07/09/2022 10:02 AM         EKG interpreted by me: NSR, diffuse JUANJO    Impression/Plan:  1. Elevated troponin     2. SVT (supraventricular tachycardia) (HCC)       1. pSVT   2. 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 Start metoprolol XL 25mg daily  - NSTEMI likely type II, trend troponin  - Check echo  - Reasonable to check stress test given elevated troponin to rule out high risk ischemia    Cardiology will follow, upon discharge reasonable to refer to cardiology as outpatient for SVT management      Mo Garber MD  Cardiac Electrophysiology

## 2022-07-09 NOTE — ASSESSMENT & PLAN NOTE
Current blood pressure acceptable  Continue home medication  On IV labetalol as needed  Monitor BP closely

## 2022-07-09 NOTE — ED NOTES
Pharmacy Medication Reconciliation      ~Medication reconciliation updated and complete per patient at bedside & patient home pharmacy  ~Allergies have been verified  ~No oral ABX within the last 30 days  ~Patient home pharmacy: [facility]

## 2022-07-09 NOTE — ED PROVIDER NOTES
"ED Provider  Scribed for Juan Manuel Garcia D.O. by Delvin Xiong. 7/9/2022  10:00 AM    Means of arrival: EMS  History obtained from: Patient  History limited by: None noted    CHIEF COMPLAINT  Chief Complaint   Patient presents with    Supraventricular Tachycardia (SVT)    Chest Pain       HPI  Hector Hernandez is a 56 y.o. male who presents via EMS for evaluation of heart palpitations onset 2.5 hours ago while at work. The patient states he also has chest pain, mild nausea, sweating, and dizziness, but denies any shortness of breath, nausea, vomiting, diarrhea, chills, cough, or congestion. He denies having any similar symptoms in the past. He denies any recent history of illness including but not limited to COVID. Per EMS, he was found on scene with a heart rate of 200, which was reduced following administration of Adenosine 6 mg. No exacerbating factors were noted.    REVIEW OF SYSTEMS  See HPI for further details. All other systems are negative.     PAST MEDICAL HISTORY   has a past medical history of Hypercholesterolemia and Hypertension.    SOCIAL HISTORY  Social History     Tobacco Use    Smoking status: Never Smoker    Smokeless tobacco: Never Used   Vaping Use    Vaping Use: Never used   Substance and Sexual Activity    Alcohol use: Never    Drug use: Never    Sexual activity: Not noted       SURGICAL HISTORY  patient denies any surgical history    CURRENT MEDICATIONS  Home Medications       Reviewed by Mechelle Shi (Pharmacy Tech) on 07/09/22 at 1129  Med List Status: Complete     Medication Last Dose Status   benazepril (LOTENSIN) 20 MG Tab 7/8/2022 Active   fenofibrate (TRIGLIDE) 160 MG tablet 7/8/2022 Active   hydroCHLOROthiazide (HYDRODIURIL) 12.5 MG tablet 7/8/2022 Active                    ALLERGIES  No Known Allergies    PHYSICAL EXAM  VITAL SIGNS: /83   Pulse 91   Temp 36.4 °C (97.5 °F) (Temporal)   Ht 1.854 m (6' 1\")   Wt 115 kg (253 lb)   SpO2 96%   BMI 33.38 kg/m² "     Constitutional: Alert in no apparent distress.  HENT: No signs of trauma, mucous membranes are moist  Eyes: Conjunctiva normal, Non-icteric.   Neck: Normal range of motion, No tenderness, Supple.  Lymphatic: No lymphadenopathy noted.   Cardiovascular: Regular rate and rhythm, no murmurs.   Thorax & Lungs: Normal breath sounds, No respiratory distress, No wheezing, No chest tenderness.   Abdomen: Bowel sounds normal, Soft, No tenderness, No masses, No pulsatile masses. No peritoneal signs.  Skin: Warm, Dry, normal color.   Back: No bony tenderness, No CVA tenderness.   Extremities: No edema, No tenderness, No cyanosis  Musculoskeletal: Good range of motion in all major joints. No tenderness to palpation or major deformities noted.   Neurologic: Alert and oriented x4, Normal motor function, Normal sensory function, No focal deficits noted.   Psychiatric: Affect normal, Judgment normal, Mood normal.     DIAGNOSTIC STUDIES / PROCEDURES    EKG  12 Lead EKG interpreted by me shown below.   Results for orders placed or performed during the hospital encounter of 22   EKG   Result Value Ref Range    Report       [facility] Emergency Dept.    Test Date:  2022  Pt Name:    JAYMIE KNOX                Department: ER  MRN:        [id number]                      Room:        10  Gender:     Male                         Technician: [id number]  :        1965                   Requested By:ER TRIAGE PROTOCOL  Order #:    [id number]                    Reading MD: DHRUV SNYDEROEufemia    Measurements  Intervals                                Axis  Rate:       87                           P:          20  SD:         160                          QRS:        48  QRSD:       94                           T:          36  QT:         336  QTc:        404    Interpretive Statements  SINUS RHYTHM  ST ELEVATION SUGGESTS PERICARDITIS  No previous ECG available for comparison  Electronically Signed On  2022 10:44:02 PDT by RICH FARIAS D.O.     EKG (NOW)   Result Value Ref Range    Report       [facility] Emergency Dept.    Test Date:  2022  Pt Name:    JAYMIE KNOX                Department: ER  MRN:        [id number]                      Room:        10  Gender:     Male                         Technician: [id number]  :        1965                   Requested By:RICH FARIAS  Order #:    [id number]                    Reading MD: RICH FARIAS D.O.    Measurements  Intervals                                Axis  Rate:       88                           P:          8  ND:         148                          QRS:        55  QRSD:       96                           T:          43  QT:         340  QTc:        412    Interpretive Statements  SINUS RHYTHM  ST ELEVATION SUGGESTS PERICARDITIS  BASELINE WANDER IN LEAD(S) V3  Compared to ECG 2022 09:50:24  No significant changes  Electronically Signed On 2022 10:43:58 PDT by RICH FARIAS D.O.         LABS  Results for orders placed or performed during the hospital encounter of 22   CBC with Differential   Result Value Ref Range    WBC 7.7 4.8 - 10.8 K/uL    RBC 5.21 4.70 - 6.10 M/uL    Hemoglobin 14.8 14.0 - 18.0 g/dL    Hematocrit 44.6 42.0 - 52.0 %    MCV 85.6 81.4 - 97.8 fL    MCH 28.4 27.0 - 33.0 pg    MCHC 33.2 (L) 33.7 - 35.3 g/dL    RDW 42.4 35.9 - 50.0 fL    Platelet Count 226 164 - 446 K/uL    MPV 9.6 9.0 - 12.9 fL    Neutrophils-Polys 62.30 44.00 - 72.00 %    Lymphocytes 26.80 22.00 - 41.00 %    Monocytes 6.60 0.00 - 13.40 %    Eosinophils 3.10 0.00 - 6.90 %    Basophils 0.80 0.00 - 1.80 %    Immature Granulocytes 0.40 0.00 - 0.90 %    Nucleated RBC 0.00 /100 WBC    Neutrophils (Absolute) 4.82 1.82 - 7.42 K/uL    Lymphs (Absolute) 2.07 1.00 - 4.80 K/uL    Monos (Absolute) 0.51 0.00 - 0.85 K/uL    Eos (Absolute) 0.24 0.00 - 0.51 K/uL    Baso (Absolute) 0.06 0.00 - 0.12 K/uL    Immature  Granulocytes (abs) 0.03 0.00 - 0.11 K/uL    NRBC (Absolute) 0.00 K/uL   Complete Metabolic Panel (CMP)   Result Value Ref Range    Sodium 138 135 - 145 mmol/L    Potassium 4.3 3.6 - 5.5 mmol/L    Chloride 107 96 - 112 mmol/L    Co2 19 (L) 20 - 33 mmol/L    Anion Gap 12.0 7.0 - 16.0    Glucose 105 (H) 65 - 99 mg/dL    Bun 16 8 - 22 mg/dL    Creatinine 1.23 0.50 - 1.40 mg/dL    Calcium 9.1 8.5 - 10.5 mg/dL    AST(SGOT) 63 (H) 12 - 45 U/L    ALT(SGPT) 79 (H) 2 - 50 U/L    Alkaline Phosphatase 95 30 - 99 U/L    Total Bilirubin 0.2 0.1 - 1.5 mg/dL    Albumin 4.4 3.2 - 4.9 g/dL    Total Protein 7.5 6.0 - 8.2 g/dL    Globulin 3.1 1.9 - 3.5 g/dL    A-G Ratio 1.4 g/dL   Troponin   Result Value Ref Range    Troponin T 42 (H) 6 - 19 ng/L   ESTIMATED GFR   Result Value Ref Range    GFR (CKD-EPI) 69 >60 mL/min/1.73 m 2   proBrain Natriuretic Peptide, NT   Result Value Ref Range    NT-proBNP 14 0 - 125 pg/mL   TSH WITH REFLEX TO FT4   Result Value Ref Range    TSH 1.610 0.380 - 5.330 uIU/mL   PROCALCITONIN   Result Value Ref Range    Procalcitonin 0.07 <0.25 ng/mL   Sed Rate   Result Value Ref Range    Sed Rate Westergren 9 0 - 20 mm/hour   CRP QUANTITIVE (NON-CARDIAC)   Result Value Ref Range    Stat C-Reactive Protein <0.30 0.00 - 0.75 mg/dL   HEPATITIS PANEL ACUTE(4 COMPONENTS)   Result Value Ref Range    Hepatitis B Surface Antigen Non-Reactive Non-Reactive    Hepatitis B Cors Ab,IgM Non-Reactive Non-Reactive    Hepatitis A Virus Ab, IgM Non-Reactive Non-Reactive    Hepatitis C Antibody Non-Reactive Non-Reactive   LACTIC ACID   Result Value Ref Range    Lactic Acid 2.2 (H) 0.5 - 2.0 mmol/L   EKG   Result Value Ref Range    Report       [facility] Emergency Dept.    Test Date:  2022  Pt Name:    JAYMIESALENA AMINKARUNA                Department:   MRN:        [id number]                      Room:       [facility]  Gender:     Male                         Technician: [id number]  :        1965                    Requested By:ER TRIAGE PROTOCOL  Order #:    [id number]                    Reading MD: IRCH FARIAS D.O.    Measurements  Intervals                                Axis  Rate:       87                           P:          20  ND:         160                          QRS:        48  QRSD:       94                           T:          36  QT:         336  QTc:        404    Interpretive Statements  SINUS RHYTHM  ST ELEVATION SUGGESTS PERICARDITIS  No previous ECG available for comparison  Electronically Signed On 2022 10:44:02 PDT by RICH FARIAS D.O.     EKG (NOW)   Result Value Ref Range    Report       [facility] Emergency Dept.    Test Date:  2022  Pt Name:    JAMYIE KNOX                Department: ER  MRN:        [id number]                      Room:        10  Gender:     Male                         Technician: [id number]  :        1965                   Requested By:RICH FARIAS  Order #:    [id number]                    Reading MD: RICH FARIAS D.O.    Measurements  Intervals                                Axis  Rate:       88                           P:          8  ND:         148                          QRS:        55  QRSD:       96                           T:          43  QT:         340  QTc:        412    Interpretive Statements  SINUS RHYTHM  ST ELEVATION SUGGESTS PERICARDITIS  BASELINE WANDER IN LEAD(S) V3  Compared to ECG 2022 09:50:24  No significant changes  Electronically Signed On 2022 10:43:58 PDT by RICH FARIAS D.O.      All labs reviewed by me.    RADIOLOGY  DX-CHEST-PORTABLE (1 VIEW)   Final Result      No evidence of acute cardiopulmonary process.      EC-ECHOCARDIOGRAM COMPLETE W/ CONT    (Results Pending)   US-RUQ    (Results Pending)     The radiologist's interpretations of all radiological studies have been reviewed by me.    Films have been independently by me      COURSE  Pertinent Labs & Imaging studies  reviewed. (See chart for details)    10:00 AM - Patient seen and examined at bedside. Informed the patient that he may never have another episode of SVT, but we want to keep looking out for signs of heart failure in the future. Ordered for DX-chest, CBC w/diff, CMP, Troponin, and EKG to evaluate his symptoms.    10:43 AM - Patient has elevated troponin of 42. Paged Hospitalist.    10:52 AM - I discussed the patient's case and the above findings with Dr. Hoffman (Hospitalist) who suggests consulting cardiology.    10:56 AM - Paged Cardiology.    11:11 AM - I discussed the patient's case and the above findings with Dr. Garber (Cardiology) who agrees to evaluate the patient at this time.      MEDICAL DECISION MAKING  This is a 56 y.o. male who presents via EMS for evaluation of heart palpitations onset 2.5 hours ago.    Patient had chest pain along with fast heart rate he was found to have SVT and he responded well to adenosine by paramedics.    On arrival here he had no chest pain.  No shortness of breath was essentially asymptomatic he was in a sinus rhythm on monitor.  His EKG shows nonspecific areas of ST elevation, there is no reciprocal changes and he has no chest pain do not suspect STEMI.  3 EKGs were done and they are all the same.    His troponin is mildly elevated, with a chest pain that he had along with the SVT the patient will be admitted for further evaluation and treatment.  Patient was placed on aspirin here.    CRITICAL CARE  The very real possibilty of a deterioration of this patient's condition required the highest level of my preparedness for sudden, emergent intervention.  I provided critical care services, which included medication orders, frequent reevaluations of the patient's condition and response to treatment, ordering and reviewing test results, and discussing the case with various consultants.  The critical care time associated with the care of the patient was 30 minutes. Review chart for  interventions. This time is exclusive of any other billable procedures.      DISPOSITION:  Patient will be hospitalized by Dr. Hoffman (Hospitalist) in guarded condition.    FINAL IMPRESSION  1. Elevated troponin    2. SVT (supraventricular tachycardia) (HCC)      Total critical care time was 30 minutes, as detailed above.     Delvin NORIEGA (Scribe), am scribing for, and in the presence of, Juan Manuel Garcia D.O..    Electronically signed by: Delvin Xiong (Scribe), 7/9/2022    Juan Manuel NORIEGA D.O. personally performed the services described in this documentation, as scribed by Delvin Xiong in my presence, and it is both accurate and complete. C.    The note accurately reflects work and decisions made by me.  Juan Manuel Garcia D.O.  7/9/2022  3:14 PM

## 2022-07-09 NOTE — ASSESSMENT & PLAN NOTE
Symptoms inconsistent with pericarditis.  Patient denies any pain while taking deep breaths or lying flat.  EKG noted with ST changes in multiple lead  Get ECHO   Start on  Ibuprofen 600  mg TID daily - taper in 2- 4 weeks   Colchicine 0.6 mg BID for   Add PPI  Cardiology consulted for further recommendation

## 2022-07-09 NOTE — ASSESSMENT & PLAN NOTE
:   -Admit to telemetry  -Follow serial troponin/CK-MB/EKG  -Transthoracic echocardiograph to identify regional wall motion abnormalities and assess LV function  -Stress test  -Aspirin 325 mg  -Cardiology consulted   -Check lipid panel  -Supplemental oxygen  -Smoking cessation

## 2022-07-09 NOTE — ED NOTES
Report attempted to CDU RN. RN currently in rounds with MD's and will return call. This RN direct extension left with UC

## 2022-07-09 NOTE — ED TRIAGE NOTES
BIB EMS to R10 w/ c/o an onset of palpitations, chest pain and dizziness at approx 0730.  Medics arrived to find pt in SVT .  Pt given Adenosine 6mg at approx 0900 and converted after the first dose.  No hx of this.  Pt arrives asymptomatic.  No c/o at this time.  In gown, on monitor, chart up for ERP.

## 2022-07-10 ENCOUNTER — APPOINTMENT (OUTPATIENT)
Dept: RADIOLOGY | Facility: MEDICAL CENTER | Age: 57
End: 2022-07-10
Attending: INTERNAL MEDICINE
Payer: COMMERCIAL

## 2022-07-10 VITALS
HEIGHT: 73 IN | HEART RATE: 72 BPM | RESPIRATION RATE: 18 BRPM | OXYGEN SATURATION: 90 % | DIASTOLIC BLOOD PRESSURE: 73 MMHG | SYSTOLIC BLOOD PRESSURE: 122 MMHG | TEMPERATURE: 97.4 F | BODY MASS INDEX: 34.33 KG/M2 | WEIGHT: 259.04 LBS

## 2022-07-10 PROBLEM — R74.01 TRANSAMINITIS: Status: RESOLVED | Noted: 2022-07-09 | Resolved: 2022-07-10

## 2022-07-10 PROBLEM — R07.89 CHEST PAIN, ATYPICAL: Status: RESOLVED | Noted: 2022-07-09 | Resolved: 2022-07-10

## 2022-07-10 PROBLEM — R07.9 CHEST PAIN: Status: RESOLVED | Noted: 2022-07-09 | Resolved: 2022-07-10

## 2022-07-10 PROBLEM — E87.8 LOW BICARBONATE: Status: RESOLVED | Noted: 2022-07-09 | Resolved: 2022-07-10

## 2022-07-10 PROBLEM — I30.0 ACUTE IDIOPATHIC PERICARDITIS: Status: RESOLVED | Noted: 2022-07-09 | Resolved: 2022-07-10

## 2022-07-10 LAB
LACTATE SERPL-SCNC: 1.2 MMOL/L (ref 0.5–2)
TROPONIN T SERPL-MCNC: 149 NG/L (ref 6–19)

## 2022-07-10 PROCEDURE — 700102 HCHG RX REV CODE 250 W/ 637 OVERRIDE(OP): Performed by: STUDENT IN AN ORGANIZED HEALTH CARE EDUCATION/TRAINING PROGRAM

## 2022-07-10 PROCEDURE — G0378 HOSPITAL OBSERVATION PER HR: HCPCS

## 2022-07-10 PROCEDURE — 36415 COLL VENOUS BLD VENIPUNCTURE: CPT

## 2022-07-10 PROCEDURE — 700105 HCHG RX REV CODE 258: Performed by: STUDENT IN AN ORGANIZED HEALTH CARE EDUCATION/TRAINING PROGRAM

## 2022-07-10 PROCEDURE — 83605 ASSAY OF LACTIC ACID: CPT

## 2022-07-10 PROCEDURE — 96372 THER/PROPH/DIAG INJ SC/IM: CPT

## 2022-07-10 PROCEDURE — 84484 ASSAY OF TROPONIN QUANT: CPT

## 2022-07-10 PROCEDURE — A9270 NON-COVERED ITEM OR SERVICE: HCPCS | Performed by: STUDENT IN AN ORGANIZED HEALTH CARE EDUCATION/TRAINING PROGRAM

## 2022-07-10 PROCEDURE — A9502 TC99M TETROFOSMIN: HCPCS

## 2022-07-10 PROCEDURE — 99217 PR OBSERVATION CARE DISCHARGE: CPT | Performed by: INTERNAL MEDICINE

## 2022-07-10 PROCEDURE — 700111 HCHG RX REV CODE 636 W/ 250 OVERRIDE (IP): Performed by: STUDENT IN AN ORGANIZED HEALTH CARE EDUCATION/TRAINING PROGRAM

## 2022-07-10 RX ORDER — METOPROLOL SUCCINATE 25 MG/1
25 TABLET, EXTENDED RELEASE ORAL DAILY
Qty: 30 TABLET | Refills: 0 | Status: SHIPPED | OUTPATIENT
Start: 2022-07-10

## 2022-07-10 RX ADMIN — ASPIRIN 81 MG: 81 TABLET, COATED ORAL at 05:28

## 2022-07-10 RX ADMIN — AMLODIPINE BESYLATE 5 MG: 5 TABLET ORAL at 05:28

## 2022-07-10 RX ADMIN — SODIUM CHLORIDE: 9 INJECTION, SOLUTION INTRAVENOUS at 04:15

## 2022-07-10 RX ADMIN — METOPROLOL TARTRATE 12.5 MG: 25 TABLET, FILM COATED ORAL at 10:45

## 2022-07-10 RX ADMIN — COLCHICINE 0.6 MG: 0.6 TABLET, FILM COATED ORAL at 05:28

## 2022-07-10 RX ADMIN — OMEPRAZOLE 20 MG: 20 CAPSULE, DELAYED RELEASE ORAL at 05:28

## 2022-07-10 RX ADMIN — HEPARIN SODIUM 5000 UNITS: 5000 INJECTION, SOLUTION INTRAVENOUS; SUBCUTANEOUS at 05:28

## 2022-07-10 ASSESSMENT — LIFESTYLE VARIABLES
EVER HAD A DRINK FIRST THING IN THE MORNING TO STEADY YOUR NERVES TO GET RID OF A HANGOVER: NO
CONSUMPTION TOTAL: NEGATIVE
ON A TYPICAL DAY WHEN YOU DRINK ALCOHOL HOW MANY DRINKS DO YOU HAVE: 0
TOTAL SCORE: 0
TOTAL SCORE: 0
EVER FELT BAD OR GUILTY ABOUT YOUR DRINKING: NO
AVERAGE NUMBER OF DAYS PER WEEK YOU HAVE A DRINK CONTAINING ALCOHOL: 0
HAVE YOU EVER FELT YOU SHOULD CUT DOWN ON YOUR DRINKING: NO
TOTAL SCORE: 0
ALCOHOL_USE: NO
HOW MANY TIMES IN THE PAST YEAR HAVE YOU HAD 5 OR MORE DRINKS IN A DAY: 0
HAVE PEOPLE ANNOYED YOU BY CRITICIZING YOUR DRINKING: NO

## 2022-07-10 NOTE — PROGRESS NOTES
Report received from FRANCIE Baez.  Assumed care of patient.  Assessment complete.  Patient sitting up in bed.  Plan of care gone over with the patient and all concerns addressed.  Patient A & O  X 4.  No apparent signs of distress.  Safety precautions in place.  Patient educated to call for assistance.  Hourly rounding  In place.

## 2022-07-10 NOTE — CARE PLAN
The patient is Watcher - Medium risk of patient condition declining or worsening    Shift Goals  Clinical Goals: stress test, monitor labs, Vitals WDL  Patient Goals: get answers, DC home  Family Goals: get answers, safe discharge    Progress made toward(s) clinical / shift goals:    Problem: Knowledge Deficit - Standard  Goal: Patient and family/care givers will demonstrate understanding of plan of care, disease process/condition, diagnostic tests and medications  7/10/2022 0147 by Sasha Huston RANA ROSA  Outcome: Progressing       Problem: Pain - Standard  Goal: Alleviation of pain or a reduction in pain to the patient’s comfort goal  Outcome: Progressing

## 2022-07-10 NOTE — DISCHARGE SUMMARY
Discharge Summary    CHIEF COMPLAINT ON ADMISSION  Chief Complaint   Patient presents with   • Supraventricular Tachycardia (SVT)   • Chest Pain       Reason for Admission  Sent By EMS     Admission Date  7/9/2022    CODE STATUS  Full Code    HPI & HOSPITAL COURSE  This is a 56 y.o. male with a past medical history of hypertension, hyperlipidemia who presented here with palpitations, diaphoresis, nausea and dizziness.  Patient states that he was drinking coffee when he suddenly developed palpitations which persisted after which she developed substernal chest pain and dizziness.  EMS was activated and the patient was noted to have a heart rate in the 200s.  EKG was consistent with SVT.  Patient received 6 mg of adenosine with conversion to normal sinus rhythm.  He had a rise in troponins likely secondary to type II NSTEMI from the SVT.  He was placed in observation and monitored overnight.  Cardiology was consulted.  He underwent an ACS rule out work-up including a nuclear medicine cardiac stress test that was negative for ischemia and a 2D echo that revealed normal LVEF and no significant valvular abnormalities.  Patient remained in sinus rhythm and was started on Toprol 25 mg daily for his SVT.  Patient did not have any further episodes of chest pain or SVT and his vitals remained stable on the day of discharge.  He has been instructed to avoid caffeine and stimulants.  He is instructed to follow-up with his PCP and cardiology in clinic      Therefore, he is discharged in good and stable condition to home with close outpatient follow-up.    The patient recovered much more quickly than anticipated on admission.    Discharge Date  7/10/22    FOLLOW UP ITEMS POST DISCHARGE  Follow up with cardiology    DISCHARGE DIAGNOSES  Principal Problem (Resolved):    Chest pain POA: Unknown  Active Problems:    Hypertension POA: Yes    SVT (supraventricular tachycardia) (HCC) POA: Yes  Resolved Problems:    Acute idiopathic  pericarditis POA: Unknown    Low bicarbonate POA: Unknown    Transaminitis POA: Unknown    Chest pain, atypical POA: Yes      FOLLOW UP  No future appointments.  Mo Garber M.D.  [address]  [address]  [facility] [id number] [phone]    Schedule an appointment as soon as possible for a visit in 2 week(s)      Jose Duval M.D.  [address] [facility]  [address]  [facility] [address] [phone]    Schedule an appointment as soon as possible for a visit in 1 week(s)        MEDICATIONS ON DISCHARGE     Medication List      START taking these medications      Instructions   metoprolol SR 25 MG Tb24  Commonly known as: TOPROL XL   Take 1 Tablet by mouth every day.  Dose: 25 mg        CONTINUE taking these medications      Instructions   benazepril 20 MG Tabs  Commonly known as: LOTENSIN   Take 20 mg by mouth every day.  Dose: 20 mg     fenofibrate 160 MG tablet  Commonly known as: TRIGLIDE   Take 160 mg by mouth every day.  Dose: 160 mg     hydroCHLOROthiazide 12.5 MG tablet  Commonly known as: HYDRODIURIL   Take 12.5 mg by mouth every day.  Dose: 12.5 mg            Allergies  No Known Allergies    DIET  Orders Placed This Encounter   Procedures   • Diet Order Diet: Cardiac; Miscellaneous modifications: (optional): No Decaf, No Caffeine(for test)     Standing Status:   Standing     Number of Occurrences:   1     Order Specific Question:   Diet:     Answer:   Cardiac [6]     Order Specific Question:   Miscellaneous modifications: (optional)     Answer:   No Decaf, No Caffeine(for test) [11]       ACTIVITY  As tolerated.  Weight bearing as tolerated    CONSULTATIONS  Cardiology Dr Garber    PROCEDURES  none    LABORATORY  Lab Results   Component Value Date    SODIUM 138 07/09/2022    POTASSIUM 4.3 07/09/2022    CHLORIDE 107 07/09/2022    CO2 19 (L) 07/09/2022    GLUCOSE 105 (H) 07/09/2022    BUN 16 07/09/2022    CREATININE 1.23 07/09/2022        Lab Results   Component Value Date    WBC 7.7 07/09/2022     HEMOGLOBIN 14.8 07/09/2022    HEMATOCRIT 44.6 07/09/2022    PLATELETCT 226 07/09/2022        Total time of the discharge process exceeds 40 minutes.

## 2022-07-10 NOTE — PROGRESS NOTES
Pt's discharge paperwork printed out and gone over with the patient.  All concerns addressed.  IV was removed and all belongings gathered to take home.  Pt was escorted out via wheelchair.

## 2022-07-10 NOTE — PROGRESS NOTES
Received report from shanta RN. Patient in bed locked in lowest position with call light in reach. POC discussed. All questions answered.

## 2022-07-10 NOTE — PROGRESS NOTES
Lab called with critical result of Troponin of 149 at 0140. Critical lab result read back to lab.   Hospitalist ANDI Morales notified of critical lab result at 0144.  No new orders received.

## 2022-07-10 NOTE — CARE PLAN
The patient is with chest pain    Shift Goals  Clinical Goals: stress test,echo  Patient Goals: safe discharge    Progress made toward(s) clinical / shift goals:  for stress test.    Patient is not progressing towards the following goals:      Problem: Knowledge Deficit - Standard  Goal: Patient and family/care givers will demonstrate understanding of plan of care, disease process/condition, diagnostic tests and medications  7/9/2022 1737 by Sunday Finn RANA ROSA  Outcome: Progressing  7/9/2022 1737 by Sunday Finn R.N.  Outcome: Progressing

## 2022-07-10 NOTE — PROGRESS NOTES
Cardiologist called back and pt is scheduled for stress tomorrow,pt educated to call for any chest pain or sob.pt is asymptomatic now,

## 2022-07-10 NOTE — PROGRESS NOTES
Monitor summary: SB/SR 52-73, AZ 0.18, QRS 0.09, QT 0.39, no ectopy per strip from monitor room.

## 2022-07-10 NOTE — CARE PLAN
Problem: Knowledge Deficit - Standard  Goal: Patient and family/care givers will demonstrate understanding of plan of care, disease process/condition, diagnostic tests and medications  Outcome: Progressing     Problem: Pain - Standard  Goal: Alleviation of pain or a reduction in pain to the patient’s comfort goal  Outcome: Progressing   The patient is Stable - Low risk of patient condition declining or worsening    Shift Goals  Clinical Goals: stress test, monitor labs, Vitals WDL  Patient Goals: get answers, DC home  Family Goals: get answers, safe discharge    Progress made toward(s) clinical / shift goals:  Pt's pain is gone.    Patient is not progressing towards the following goals: N/A

## 2022-07-10 NOTE — DISCHARGE INSTRUCTIONS
Discharge Instructions    Discharged to home by car with relative. Discharged via wheelchair, hospital escort: Yes.  Special equipment needed: Not Applicable    Be sure to schedule a follow-up appointment with your primary care doctor or any specialists as instructed.     Discharge Plan:   Diet Plan: Discussed  Activity Level: Discussed  Confirmed Follow up Appointment: Patient to Call and Schedule Appointment  Confirmed Symptoms Management: Discussed  Medication Reconciliation Updated: Yes    I understand that a diet low in cholesterol, fat, and sodium is recommended for good health. Unless I have been given specific instructions below for another diet, I accept this instruction as my diet prescription.   Other diet: Heart healthy    Special Instructions: None    -Is this patient being discharged with medication to prevent blood clots?  No    Is patient discharged on Warfarin / Coumadin?   No

## 2022-09-13 ENCOUNTER — TELEPHONE (OUTPATIENT)
Dept: CARDIOLOGY | Facility: MEDICAL CENTER | Age: 57
End: 2022-09-13
Payer: COMMERCIAL

## 2022-09-13 NOTE — TELEPHONE ENCOUNTER
Called patient to request records for NP appointment with Dr. Garber. Called to confirm if this will be first time patient is seeing a cardiologist as well as verify all records including labs, imaging and any other cardiac testing are in epic. No answer, unable to leave voicemail.

## 2022-09-19 ENCOUNTER — TELEPHONE (OUTPATIENT)
Dept: CARDIOLOGY | Facility: MEDICAL CENTER | Age: 57
End: 2022-09-19
Payer: COMMERCIAL